# Patient Record
Sex: MALE | Race: OTHER | Employment: FULL TIME | URBAN - METROPOLITAN AREA
[De-identification: names, ages, dates, MRNs, and addresses within clinical notes are randomized per-mention and may not be internally consistent; named-entity substitution may affect disease eponyms.]

---

## 2018-05-16 ENCOUNTER — OFFICE VISIT (OUTPATIENT)
Dept: FAMILY MEDICINE CLINIC | Facility: CLINIC | Age: 53
End: 2018-05-16

## 2018-05-16 VITALS
WEIGHT: 198 LBS | HEIGHT: 67.32 IN | BODY MASS INDEX: 30.71 KG/M2 | HEART RATE: 66 BPM | SYSTOLIC BLOOD PRESSURE: 122 MMHG | DIASTOLIC BLOOD PRESSURE: 76 MMHG

## 2018-05-16 DIAGNOSIS — I10 ESSENTIAL HYPERTENSION: Primary | ICD-10-CM

## 2018-05-16 PROCEDURE — 99203 OFFICE O/P NEW LOW 30 MIN: CPT | Performed by: FAMILY MEDICINE

## 2018-05-16 RX ORDER — NEBIVOLOL HYDROCHLORIDE 20 MG/1
1 TABLET ORAL DAILY
COMMUNITY
Start: 2018-04-16 | End: 2018-05-16

## 2018-05-16 RX ORDER — NEBIVOLOL HYDROCHLORIDE 20 MG/1
20 TABLET ORAL DAILY
Qty: 30 TABLET | Refills: 0 | Status: CANCELLED | OUTPATIENT
Start: 2018-05-16

## 2018-05-16 RX ORDER — CHLORTHALIDONE 25 MG/1
1 TABLET ORAL DAILY
COMMUNITY
Start: 2018-04-16 | End: 2018-05-16

## 2018-05-16 RX ORDER — CHLORTHALIDONE 25 MG/1
25 TABLET ORAL DAILY
Qty: 90 TABLET | Refills: 1 | Status: SHIPPED | OUTPATIENT
Start: 2018-05-16 | End: 2018-10-10

## 2018-05-16 RX ORDER — METOPROLOL SUCCINATE 50 MG/1
50 TABLET, EXTENDED RELEASE ORAL DAILY
Qty: 90 TABLET | Refills: 1 | Status: SHIPPED | OUTPATIENT
Start: 2018-05-16 | End: 2018-07-13

## 2018-05-16 NOTE — PATIENT INSTRUCTIONS
-- stop bystolic  -- start metoprolol 50mg daily  -- continue chlorthalidone 25mg daily  -- check blood pressure daily for next 1-2 wks  -- call if BP consistently > 140/90

## 2018-05-16 NOTE — PROGRESS NOTES
Dennis Del Valle is a 48year old male here for Patient presents with:  Hypertension      HPI:       HTN  -tolerating meds  -on bystolic and chlorthalidone  -last BP check was 6 months ago - thinks he had a physical at that time  -denies chest pain, palpitat in this encounter. Meds This Visit:    Signed Prescriptions Disp Refills    chlorthalidone 25 MG Oral Tab 90 tablet 1      Sig: Take 1 tablet (25 mg total) by mouth daily.       Metoprolol Succinate ER 50 MG Oral Tablet 24 Hr 90 tablet 1      Sig: Take

## 2018-07-13 ENCOUNTER — OFFICE VISIT (OUTPATIENT)
Dept: FAMILY MEDICINE CLINIC | Facility: CLINIC | Age: 53
End: 2018-07-13

## 2018-07-13 VITALS
WEIGHT: 200 LBS | BODY MASS INDEX: 31.02 KG/M2 | HEART RATE: 68 BPM | DIASTOLIC BLOOD PRESSURE: 76 MMHG | SYSTOLIC BLOOD PRESSURE: 122 MMHG | HEIGHT: 67.32 IN

## 2018-07-13 DIAGNOSIS — N52.8 OTHER MALE ERECTILE DYSFUNCTION: ICD-10-CM

## 2018-07-13 DIAGNOSIS — I10 ESSENTIAL HYPERTENSION: Primary | ICD-10-CM

## 2018-07-13 PROCEDURE — 99214 OFFICE O/P EST MOD 30 MIN: CPT | Performed by: FAMILY MEDICINE

## 2018-07-13 RX ORDER — METOPROLOL SUCCINATE 50 MG/1
50 TABLET, EXTENDED RELEASE ORAL DAILY
Qty: 90 TABLET | Refills: 1 | Status: SHIPPED | OUTPATIENT
Start: 2018-07-13 | End: 2018-10-10

## 2018-07-13 RX ORDER — CHLORTHALIDONE 25 MG/1
25 TABLET ORAL DAILY
Qty: 90 TABLET | Refills: 1 | Status: CANCELLED | OUTPATIENT
Start: 2018-07-13

## 2018-07-13 RX ORDER — SILDENAFIL 100 MG/1
100 TABLET, FILM COATED ORAL
Qty: 8 TABLET | Refills: 2 | Status: SHIPPED | OUTPATIENT
Start: 2018-07-13 | End: 2019-03-25

## 2018-07-13 NOTE — PATIENT INSTRUCTIONS
-- pare chlorthalidone    -- continua metoprolol    -- chequar wagner presion 3 - 5 veces al semana    -- regresa en un mes con wagner numeros

## 2018-07-13 NOTE — PROGRESS NOTES
Doan Caputo is a 48year old male here for Patient presents with:  Hypertension      HPI:       1.  Essential hypertension  -liquid melted his chlorthalidone about 1 wk ago  -since then, has only been taking metoprolol  -BP seemed controlled at home  -wo 122/76 (BP Location: Left arm, Patient Position: Sitting, Cuff Size: large)   Pulse 68   Ht 67.32\"   Wt 200 lb   BMI 31.03 kg/m²     Gen: NAD, alert and oriented x 3  HEENT: NCAT, pupils equal and round  Pulm: CTAB, no wheezing  CV: RRR, no murmurs  Ext:

## 2018-08-03 ENCOUNTER — APPOINTMENT (OUTPATIENT)
Dept: GENERAL RADIOLOGY | Age: 53
End: 2018-08-03
Attending: FAMILY MEDICINE
Payer: COMMERCIAL

## 2018-08-03 ENCOUNTER — APPOINTMENT (OUTPATIENT)
Dept: GENERAL RADIOLOGY | Facility: HOSPITAL | Age: 53
End: 2018-08-03
Payer: COMMERCIAL

## 2018-08-03 ENCOUNTER — HOSPITAL ENCOUNTER (OUTPATIENT)
Age: 53
Discharge: ANOTHER HOSPITAL | End: 2018-08-03
Attending: FAMILY MEDICINE
Payer: COMMERCIAL

## 2018-08-03 ENCOUNTER — HOSPITAL ENCOUNTER (EMERGENCY)
Facility: HOSPITAL | Age: 53
Discharge: HOME | End: 2018-08-03
Attending: EMERGENCY MEDICINE
Payer: COMMERCIAL

## 2018-08-03 VITALS
OXYGEN SATURATION: 93 % | WEIGHT: 195 LBS | BODY MASS INDEX: 29.55 KG/M2 | HEART RATE: 56 BPM | RESPIRATION RATE: 18 BRPM | HEIGHT: 68 IN | TEMPERATURE: 97 F | SYSTOLIC BLOOD PRESSURE: 151 MMHG | DIASTOLIC BLOOD PRESSURE: 92 MMHG

## 2018-08-03 VITALS
DIASTOLIC BLOOD PRESSURE: 100 MMHG | SYSTOLIC BLOOD PRESSURE: 146 MMHG | OXYGEN SATURATION: 97 % | HEART RATE: 66 BPM | RESPIRATION RATE: 18 BRPM | TEMPERATURE: 98 F

## 2018-08-03 DIAGNOSIS — L81.9 DISCOLORATION OF SKIN OF FINGER: Primary | ICD-10-CM

## 2018-08-03 DIAGNOSIS — I99.9 CIRCULATORY SYSTEM DISORDER: Primary | ICD-10-CM

## 2018-08-03 DIAGNOSIS — I73.00 RAYNAUD'S DISEASE WITHOUT GANGRENE: ICD-10-CM

## 2018-08-03 LAB
ALBUMIN SERPL-MCNC: 4 G/DL (ref 3.5–4.8)
ALBUMIN/GLOB SERPL: 1.2 {RATIO} (ref 1–2)
ALP LIVER SERPL-CCNC: 75 U/L (ref 45–117)
ALT SERPL-CCNC: 39 U/L (ref 17–63)
ANION GAP SERPL CALC-SCNC: 6 MMOL/L (ref 0–18)
AST SERPL-CCNC: 27 U/L (ref 15–41)
BASOPHILS # BLD AUTO: 0.03 X10(3) UL (ref 0–0.1)
BASOPHILS NFR BLD AUTO: 0.5 %
BILIRUB SERPL-MCNC: 0.5 MG/DL (ref 0.1–2)
BUN BLD-MCNC: 18 MG/DL (ref 8–20)
BUN/CREAT SERPL: 18.8 (ref 10–20)
CALCIUM BLD-MCNC: 8.9 MG/DL (ref 8.3–10.3)
CHLORIDE SERPL-SCNC: 107 MMOL/L (ref 101–111)
CO2 SERPL-SCNC: 29 MMOL/L (ref 22–32)
CREAT BLD-MCNC: 0.96 MG/DL (ref 0.7–1.3)
EOSINOPHIL # BLD AUTO: 0.15 X10(3) UL (ref 0–0.3)
EOSINOPHIL NFR BLD AUTO: 2.6 %
ERYTHROCYTE [DISTWIDTH] IN BLOOD BY AUTOMATED COUNT: 12.7 % (ref 11.5–16)
GLOBULIN PLAS-MCNC: 3.4 G/DL (ref 2.5–3.7)
GLUCOSE BLD-MCNC: 100 MG/DL (ref 70–99)
HCT VFR BLD AUTO: 42.8 % (ref 37–53)
HGB BLD-MCNC: 14.8 G/DL (ref 13–17)
IMMATURE GRANULOCYTE COUNT: 0.02 X10(3) UL (ref 0–1)
IMMATURE GRANULOCYTE RATIO %: 0.3 %
LYMPHOCYTES # BLD AUTO: 2.61 X10(3) UL (ref 0.9–4)
LYMPHOCYTES NFR BLD AUTO: 45.6 %
M PROTEIN MFR SERPL ELPH: 7.4 G/DL (ref 6.1–8.3)
MCH RBC QN AUTO: 31.6 PG (ref 27–33.2)
MCHC RBC AUTO-ENTMCNC: 34.6 G/DL (ref 31–37)
MCV RBC AUTO: 91.3 FL (ref 80–99)
MONOCYTES # BLD AUTO: 0.56 X10(3) UL (ref 0.1–1)
MONOCYTES NFR BLD AUTO: 9.8 %
NEUTROPHIL ABS PRELIM: 2.35 X10 (3) UL (ref 1.3–6.7)
NEUTROPHILS # BLD AUTO: 2.35 X10(3) UL (ref 1.3–6.7)
NEUTROPHILS NFR BLD AUTO: 41.2 %
OSMOLALITY SERPL CALC.SUM OF ELEC: 296 MOSM/KG (ref 275–295)
PLATELET # BLD AUTO: 220 10(3)UL (ref 150–450)
POTASSIUM SERPL-SCNC: 3 MMOL/L (ref 3.6–5.1)
RBC # BLD AUTO: 4.69 X10(6)UL (ref 4.3–5.7)
RED CELL DISTRIBUTION WIDTH-SD: 42 FL (ref 35.1–46.3)
SED RATE-ML: 10 MM/HR (ref 0–12)
SODIUM SERPL-SCNC: 142 MMOL/L (ref 136–144)
WBC # BLD AUTO: 5.7 X10(3) UL (ref 4–13)

## 2018-08-03 PROCEDURE — 99203 OFFICE O/P NEW LOW 30 MIN: CPT

## 2018-08-03 PROCEDURE — 73130 X-RAY EXAM OF HAND: CPT | Performed by: EMERGENCY MEDICINE

## 2018-08-03 PROCEDURE — 85025 COMPLETE CBC W/AUTO DIFF WBC: CPT | Performed by: PHYSICIAN ASSISTANT

## 2018-08-03 PROCEDURE — 80053 COMPREHEN METABOLIC PANEL: CPT | Performed by: PHYSICIAN ASSISTANT

## 2018-08-03 PROCEDURE — 36415 COLL VENOUS BLD VENIPUNCTURE: CPT

## 2018-08-03 PROCEDURE — 99284 EMERGENCY DEPT VISIT MOD MDM: CPT

## 2018-08-03 PROCEDURE — 73140 X-RAY EXAM OF FINGER(S): CPT | Performed by: FAMILY MEDICINE

## 2018-08-03 PROCEDURE — 85652 RBC SED RATE AUTOMATED: CPT | Performed by: PHYSICIAN ASSISTANT

## 2018-08-03 PROCEDURE — 99213 OFFICE O/P EST LOW 20 MIN: CPT

## 2018-08-03 NOTE — ED INITIAL ASSESSMENT (HPI)
States his left fifth finger has been purple for the last two days. Denies any injury. States feels numb at times.

## 2018-08-04 NOTE — ED PROVIDER NOTES
Patient Seen in: [facility]    History   Patient presents with:  Upper Extremity Injury (musculoskeletal)    Stated Complaint: right pinky changes colors, and is numb, x2days    HPI    48year old male presents for right little digit. CR > 4-5 sec. Mild tenderness over DIP. FROM. Rest of the fingers and hand exam was normal.    Neurological: He is alert and oriented to person, place, and time. He has normal reflexes. Skin: Skin is warm and dry.        ED Course   Labs Reviewed -

## 2018-08-04 NOTE — ED PROVIDER NOTES
Patient Seen in: [facility] Emergency Department    History   Patient presents with:  Upper Extremity Injury (musculoskeletal)    Stated Complaint: finger discoloration    HPI    Zoie Gamboa is a 45-year-old male who presents today for evaluation of right Temp 97.2 °F (36.2 °C)   Resp 18   Ht 172.7 cm (5' 8\")   Wt 88.5 kg   SpO2 93%   BMI 29.65 kg/m²         Physical Exam   Constitutional: He is oriented to person, place, and time. He appears well-developed and well-nourished.    HENT:   Head: Normocephalic 2325  ------------------------------------------------------------  This case is discussed with Dr. Jay Hernández who agrees with the plan of care.     MDM   Clinical impression: Raynaud's disease without gangrene  Plan: I discussed our concerns with the patient

## 2018-09-05 ENCOUNTER — OFFICE VISIT (OUTPATIENT)
Dept: FAMILY MEDICINE CLINIC | Facility: CLINIC | Age: 53
End: 2018-09-05
Payer: COMMERCIAL

## 2018-09-05 VITALS
HEIGHT: 67.32 IN | DIASTOLIC BLOOD PRESSURE: 89 MMHG | WEIGHT: 198 LBS | SYSTOLIC BLOOD PRESSURE: 137 MMHG | BODY MASS INDEX: 30.71 KG/M2 | HEART RATE: 66 BPM

## 2018-09-05 DIAGNOSIS — Z12.11 COLON CANCER SCREENING: ICD-10-CM

## 2018-09-05 DIAGNOSIS — I73.00 RAYNAUD'S DISEASE WITHOUT GANGRENE: ICD-10-CM

## 2018-09-05 DIAGNOSIS — I10 ESSENTIAL HYPERTENSION: ICD-10-CM

## 2018-09-05 DIAGNOSIS — Z00.00 ROUTINE GENERAL MEDICAL EXAMINATION AT A HEALTH CARE FACILITY: Primary | ICD-10-CM

## 2018-09-05 PROCEDURE — 99396 PREV VISIT EST AGE 40-64: CPT | Performed by: FAMILY MEDICINE

## 2018-09-05 PROCEDURE — 99214 OFFICE O/P EST MOD 30 MIN: CPT | Performed by: FAMILY MEDICINE

## 2018-09-05 RX ORDER — AMLODIPINE BESYLATE 5 MG/1
5 TABLET ORAL DAILY
Qty: 30 TABLET | Refills: 2 | Status: SHIPPED | OUTPATIENT
Start: 2018-09-05 | End: 2018-10-10 | Stop reason: DRUGHIGH

## 2018-09-05 NOTE — PATIENT INSTRUCTIONS
-- pare chlorthalidone  -- empeza amlodipine 5 mg daily  -- hace kayla alphonso para shannan en [place], agua esta [place]    -- llama para hacer kayla alphonso de rheumatologo (para wagner dedo) y colonoscopia    -- regresa en un mes

## 2018-09-06 NOTE — PROGRESS NOTES
Fidencio Morales is a 48year old male here for Patient presents with: Well Adult      HPI:       1.  Routine general medical examination at a health care facility  -here for wellness  -due for colonoscopy  -no urinary symptoms  -no moles  -doesn't smoke or chlorthalidone 25 MG Oral Tab Take 1 tablet (25 mg total) by mouth daily.  Disp: 90 tablet Rfl: 1       Allergies:    Cefaclor                RASH      ROS:     --GEN: Denies  --HEENT: Denies  --RESP: Denies  --CV: Denies  --GI: Denies  --: Denies  --[facility] & Referrals:  More Becerra MD

## 2018-09-27 ENCOUNTER — TELEPHONE (OUTPATIENT)
Dept: FAMILY MEDICINE CLINIC | Facility: CLINIC | Age: 53
End: 2018-09-27

## 2018-09-27 NOTE — TELEPHONE ENCOUNTER
Pt's daughter called and said Renata Houston needs a refill on Amlodipine 5mg 2 daily. He would like it called in to [facility] in [place] on [address].

## 2018-09-28 RX ORDER — AMLODIPINE BESYLATE 10 MG/1
10 TABLET ORAL DAILY
Qty: 30 TABLET | Refills: 0 | Status: SHIPPED | OUTPATIENT
Start: 2018-09-28 | End: 2018-10-10

## 2018-09-28 NOTE — TELEPHONE ENCOUNTER
Patient has refills remaining of the amlodipine at the pharmacy  Patient will need to contact the pharmacy for refills  Patient should only be taking 1 amlodipine per day    Patient increased amlodipine to 2/day.  Abigail Collins told him to increase amlodipi

## 2018-10-10 ENCOUNTER — OFFICE VISIT (OUTPATIENT)
Dept: FAMILY MEDICINE CLINIC | Facility: CLINIC | Age: 53
End: 2018-10-10
Payer: COMMERCIAL

## 2018-10-10 VITALS
SYSTOLIC BLOOD PRESSURE: 132 MMHG | WEIGHT: 199 LBS | HEART RATE: 76 BPM | DIASTOLIC BLOOD PRESSURE: 76 MMHG | OXYGEN SATURATION: 94 % | HEIGHT: 67.32 IN | TEMPERATURE: 99 F | BODY MASS INDEX: 30.87 KG/M2

## 2018-10-10 DIAGNOSIS — I73.00 RAYNAUD'S DISEASE WITHOUT GANGRENE: ICD-10-CM

## 2018-10-10 DIAGNOSIS — I10 ESSENTIAL HYPERTENSION: Primary | ICD-10-CM

## 2018-10-10 DIAGNOSIS — R07.89 ATYPICAL CHEST PAIN: ICD-10-CM

## 2018-10-10 PROCEDURE — 99214 OFFICE O/P EST MOD 30 MIN: CPT | Performed by: FAMILY MEDICINE

## 2018-10-10 PROCEDURE — 93000 ELECTROCARDIOGRAM COMPLETE: CPT | Performed by: FAMILY MEDICINE

## 2018-10-10 RX ORDER — METOPROLOL SUCCINATE 50 MG/1
50 TABLET, EXTENDED RELEASE ORAL DAILY
Qty: 90 TABLET | Refills: 1 | Status: SHIPPED | OUTPATIENT
Start: 2018-10-10 | End: 2019-03-25

## 2018-10-10 RX ORDER — AMLODIPINE BESYLATE 5 MG/1
5 TABLET ORAL DAILY
Qty: 90 TABLET | Refills: 2 | Status: SHIPPED | OUTPATIENT
Start: 2018-10-10 | End: 2018-10-22

## 2018-10-10 NOTE — PROGRESS NOTES
Rimma Sanchez is a 48year old male here for No chief complaint on file. HPI:       1.  Essential hypertension  -BP better, but developed leg swelling  -taking amlodipine 10mg daily  -off metoprolol (wanted to try only 1 med) and chlorthalidone (cause reviewed are negative    PHYSICAL EXAM:   /76 (BP Location: Right arm, Patient Position: Sitting, Cuff Size: large)   Pulse 76   Temp 98.5 °F (36.9 °C) (Oral)   Ht 67.32\"   Wt 199 lb   SpO2 94%   BMI 30.87 kg/m²     Gen: NAD, alert and oriented x 3

## 2018-10-11 NOTE — PATIENT INSTRUCTIONS
-- STOP amlodipine 10  -- start amlodipine 5 mg daily (if you have enough, you can use 1/2 tab of the 10mg until you run out)  -- start metoprolol 50mg ER daily again  -- you should no longer be taking chlorthalidone  -- come back in 1 month, sooner if n

## 2018-10-22 ENCOUNTER — OFFICE VISIT (OUTPATIENT)
Dept: FAMILY MEDICINE CLINIC | Facility: CLINIC | Age: 53
End: 2018-10-22
Payer: COMMERCIAL

## 2018-10-22 VITALS
HEIGHT: 67.32 IN | DIASTOLIC BLOOD PRESSURE: 83 MMHG | HEART RATE: 78 BPM | SYSTOLIC BLOOD PRESSURE: 134 MMHG | WEIGHT: 198.81 LBS | TEMPERATURE: 98 F | BODY MASS INDEX: 30.84 KG/M2 | RESPIRATION RATE: 16 BRPM

## 2018-10-22 DIAGNOSIS — R60.0 LOWER EXTREMITY EDEMA: ICD-10-CM

## 2018-10-22 DIAGNOSIS — R07.89 ATYPICAL CHEST PAIN: ICD-10-CM

## 2018-10-22 DIAGNOSIS — I10 ESSENTIAL HYPERTENSION: Primary | ICD-10-CM

## 2018-10-22 PROCEDURE — 99214 OFFICE O/P EST MOD 30 MIN: CPT | Performed by: FAMILY MEDICINE

## 2018-10-22 RX ORDER — HYDROCHLOROTHIAZIDE 12.5 MG/1
12.5 TABLET ORAL DAILY
Qty: 90 TABLET | Refills: 0 | Status: SHIPPED | OUTPATIENT
Start: 2018-10-22 | End: 2019-01-21

## 2018-10-22 NOTE — PATIENT INSTRUCTIONS
-- stop amlodipine  -- start hydrochlorothiazide 12.5mg daily  -- continue metoprolol cada lucius  -- va a lab en 2 semanas  -- regresa aqui despues shannan para wagner presion

## 2018-10-22 NOTE — PROGRESS NOTES
Kayleigh Gilbert is a 48year old male here for Patient presents with:  Medication Follow-Up: Patient had some swelling in legs after starting BP medication. No current swelling      HPI:       1. Essential hypertension  2.  Lower extremity edema  -bp good  - kg/m²     Gen: NAD, alert and oriented x 3  HEENT: NCAT, pupils equal and round  Pulm: CTAB, no wheezing  CV: RRR, no murmurs  Abd: soft, ND, NT, +BS  Ext: full ROM  Psych: normal affect     ASSESSMENT/PLAN:     1. Essential hypertension  2.  Lower extremit

## 2018-11-02 ENCOUNTER — APPOINTMENT (OUTPATIENT)
Dept: LAB | Age: 53
End: 2018-11-02
Attending: FAMILY MEDICINE
Payer: COMMERCIAL

## 2018-11-02 DIAGNOSIS — I73.00 RAYNAUD'S DISEASE WITHOUT GANGRENE: ICD-10-CM

## 2018-11-02 DIAGNOSIS — Z00.00 ROUTINE GENERAL MEDICAL EXAMINATION AT A HEALTH CARE FACILITY: ICD-10-CM

## 2018-11-02 PROCEDURE — 85652 RBC SED RATE AUTOMATED: CPT | Performed by: FAMILY MEDICINE

## 2018-11-02 PROCEDURE — 86200 CCP ANTIBODY: CPT | Performed by: FAMILY MEDICINE

## 2018-11-02 PROCEDURE — 84153 ASSAY OF PSA TOTAL: CPT | Performed by: FAMILY MEDICINE

## 2018-11-02 PROCEDURE — 80061 LIPID PANEL: CPT | Performed by: FAMILY MEDICINE

## 2018-11-02 PROCEDURE — 86140 C-REACTIVE PROTEIN: CPT | Performed by: FAMILY MEDICINE

## 2018-11-02 PROCEDURE — 84550 ASSAY OF BLOOD/URIC ACID: CPT | Performed by: FAMILY MEDICINE

## 2018-11-02 PROCEDURE — 86038 ANTINUCLEAR ANTIBODIES: CPT | Performed by: FAMILY MEDICINE

## 2018-11-02 PROCEDURE — 84443 ASSAY THYROID STIM HORMONE: CPT | Performed by: FAMILY MEDICINE

## 2018-11-02 PROCEDURE — 36415 COLL VENOUS BLD VENIPUNCTURE: CPT | Performed by: FAMILY MEDICINE

## 2018-11-02 PROCEDURE — 80053 COMPREHEN METABOLIC PANEL: CPT | Performed by: FAMILY MEDICINE

## 2018-11-02 PROCEDURE — 86431 RHEUMATOID FACTOR QUANT: CPT | Performed by: FAMILY MEDICINE

## 2018-11-06 ENCOUNTER — TELEPHONE (OUTPATIENT)
Dept: FAMILY MEDICINE CLINIC | Facility: CLINIC | Age: 53
End: 2018-11-06

## 2018-11-06 NOTE — TELEPHONE ENCOUNTER
----- Message from Jagjit Sanchez MD sent at 11/3/2018  1:25 PM CDT -----  Cholesterol mildly elevated - increase exercise, reduce fats and sugars, more fruits and veggies; otherwise  Labs normal, followup as planned

## 2018-12-10 ENCOUNTER — TELEPHONE (OUTPATIENT)
Dept: FAMILY MEDICINE CLINIC | Facility: CLINIC | Age: 53
End: 2018-12-10

## 2019-01-21 RX ORDER — HYDROCHLOROTHIAZIDE 12.5 MG/1
TABLET ORAL
Qty: 90 TABLET | Refills: 0 | Status: SHIPPED | OUTPATIENT
Start: 2019-01-21 | End: 2019-03-25

## 2019-03-21 ENCOUNTER — TELEPHONE (OUTPATIENT)
Dept: FAMILY MEDICINE CLINIC | Facility: CLINIC | Age: 54
End: 2019-03-21

## 2019-03-21 NOTE — TELEPHONE ENCOUNTER
Spoke with patient's wife informed her to have patient keep appointment for bp follow up. Patient's wife informed me that patient has hand swelling.  Patient will keep appointment for 03/25/2018

## 2019-03-25 ENCOUNTER — OFFICE VISIT (OUTPATIENT)
Dept: FAMILY MEDICINE CLINIC | Facility: CLINIC | Age: 54
End: 2019-03-25
Payer: COMMERCIAL

## 2019-03-25 VITALS
BODY MASS INDEX: 30.87 KG/M2 | SYSTOLIC BLOOD PRESSURE: 152 MMHG | WEIGHT: 199 LBS | HEART RATE: 64 BPM | TEMPERATURE: 99 F | DIASTOLIC BLOOD PRESSURE: 100 MMHG | HEIGHT: 67.32 IN

## 2019-03-25 DIAGNOSIS — G89.29 CHRONIC RIGHT SHOULDER PAIN: Primary | ICD-10-CM

## 2019-03-25 DIAGNOSIS — I10 ESSENTIAL HYPERTENSION: ICD-10-CM

## 2019-03-25 DIAGNOSIS — M25.511 CHRONIC RIGHT SHOULDER PAIN: Primary | ICD-10-CM

## 2019-03-25 PROCEDURE — 99214 OFFICE O/P EST MOD 30 MIN: CPT | Performed by: FAMILY MEDICINE

## 2019-03-25 RX ORDER — SILDENAFIL 100 MG/1
100 TABLET, FILM COATED ORAL
Qty: 8 TABLET | Refills: 2 | Status: SHIPPED | OUTPATIENT
Start: 2019-03-25 | End: 2019-10-15

## 2019-03-25 RX ORDER — HYDROCHLOROTHIAZIDE 25 MG/1
25 TABLET ORAL DAILY
Qty: 90 TABLET | Refills: 1 | Status: SHIPPED | OUTPATIENT
Start: 2019-03-25 | End: 2019-09-12

## 2019-03-25 RX ORDER — METOPROLOL SUCCINATE 50 MG/1
50 TABLET, EXTENDED RELEASE ORAL DAILY
Qty: 90 TABLET | Refills: 1 | Status: SHIPPED | OUTPATIENT
Start: 2019-03-25 | End: 2019-09-12

## 2019-03-25 NOTE — PROGRESS NOTES
Ignacia Degree is a 47year old male here for Patient presents with:  Hypertension: Follow up      HPI:       1. Chronic right shoulder pain  -continues to have chronic shoulder pain  -not improving with home exercises    2.  Essential hypertension  -here t NCAT, pupils equal and round  Pulm: CTAB, no wheezing  CV: RRR, no murmurs  Ext: full ROM  Psych: normal affect     ASSESSMENT/PLAN:     1.  Chronic right shoulder pain  -not improving with home exercises  -start PT   -f/u in 2-4 wks, sooner prn  - OP REFER

## 2019-03-25 NOTE — PATIENT INSTRUCTIONS
-- empeza omega-3 cada lucius    -- sube hydrochlorothiazide a 25mg cada lucius  -- continua metoprolol 50mg cada lucius    -- haceuna alphonso de terapia  --ejercisios de wagner hombro cada lucius    regresa en 2 semanas para pruebas de shannan    regresa para kayla alphonso en 2-4

## 2019-04-24 ENCOUNTER — OFFICE VISIT (OUTPATIENT)
Dept: FAMILY MEDICINE CLINIC | Facility: CLINIC | Age: 54
End: 2019-04-24
Payer: COMMERCIAL

## 2019-04-24 VITALS
BODY MASS INDEX: 30.48 KG/M2 | TEMPERATURE: 98 F | SYSTOLIC BLOOD PRESSURE: 130 MMHG | HEIGHT: 67.32 IN | DIASTOLIC BLOOD PRESSURE: 82 MMHG | WEIGHT: 196.5 LBS | HEART RATE: 70 BPM

## 2019-04-24 DIAGNOSIS — M25.511 CHRONIC RIGHT SHOULDER PAIN: ICD-10-CM

## 2019-04-24 DIAGNOSIS — G89.29 CHRONIC RIGHT SHOULDER PAIN: ICD-10-CM

## 2019-04-24 DIAGNOSIS — I10 ESSENTIAL HYPERTENSION: Primary | ICD-10-CM

## 2019-04-24 DIAGNOSIS — Z12.11 SCREENING FOR COLON CANCER: ICD-10-CM

## 2019-04-24 PROCEDURE — 99214 OFFICE O/P EST MOD 30 MIN: CPT | Performed by: FAMILY MEDICINE

## 2019-04-24 NOTE — PROGRESS NOTES
Roberto Carlos Bueno is a 47year old male here for Patient presents with:  Hypertension: follow up      HPI:       1. Essential hypertension  -tolerating meds  -bp better at home  -here for bp recheck    2. Chronic right shoulder pain  -improved    3.  Screening BMI 30.48 kg/m²     Gen: NAD, alert and oriented x 3  HEENT: NCAT, pupils equal and round  Pulm: CTAB, no wheezing  CV: RRR, no murmurs  Ext: full ROM  Psych: normal affect     ASSESSMENT/PLAN:     1.  Essential hypertension  -improved  -c/w current meds

## 2019-04-24 NOTE — PATIENT INSTRUCTIONS
-- continua todo wagner medicamentos  --llama para alphonso de gastro para colonoscopia    --regresa en 6 meses, antes si necessita

## 2019-09-12 ENCOUNTER — TELEPHONE (OUTPATIENT)
Dept: FAMILY MEDICINE CLINIC | Facility: CLINIC | Age: 54
End: 2019-09-12

## 2019-09-12 DIAGNOSIS — I10 ESSENTIAL HYPERTENSION: Primary | ICD-10-CM

## 2019-09-12 RX ORDER — METOPROLOL SUCCINATE 50 MG/1
50 TABLET, EXTENDED RELEASE ORAL DAILY
Qty: 30 TABLET | Refills: 0 | Status: SHIPPED | OUTPATIENT
Start: 2019-09-12 | End: 2019-10-15

## 2019-09-12 RX ORDER — HYDROCHLOROTHIAZIDE 25 MG/1
25 TABLET ORAL DAILY
Qty: 30 TABLET | Refills: 0 | Status: SHIPPED | OUTPATIENT
Start: 2019-09-12 | End: 2019-10-15

## 2019-09-12 NOTE — TELEPHONE ENCOUNTER
Pt requesting refill of Hydrochlorothiazide, Metoprolol, refill approved, sent to pharmacy for qty# 30    Last Time Medication was Filled:  3/25/19    Last Office Visit with PCP: 4/24/2019    Future appointment 10/15/19

## 2019-09-18 RX ORDER — HYDROCHLOROTHIAZIDE 25 MG/1
TABLET ORAL
Qty: 90 TABLET | Refills: 0 | OUTPATIENT
Start: 2019-09-18

## 2019-09-18 RX ORDER — METOPROLOL SUCCINATE 50 MG/1
TABLET, EXTENDED RELEASE ORAL
Qty: 90 TABLET | Refills: 0 | OUTPATIENT
Start: 2019-09-18

## 2019-09-18 NOTE — TELEPHONE ENCOUNTER
90 day supply denied.  Sent Qty#30 on 9/12/19 has follow up appointment 10/15/2019 for further refills

## 2019-10-15 ENCOUNTER — OFFICE VISIT (OUTPATIENT)
Dept: FAMILY MEDICINE CLINIC | Facility: CLINIC | Age: 54
End: 2019-10-15
Payer: COMMERCIAL

## 2019-10-15 VITALS
HEIGHT: 67.32 IN | BODY MASS INDEX: 30.25 KG/M2 | TEMPERATURE: 97 F | WEIGHT: 195 LBS | HEART RATE: 85 BPM | DIASTOLIC BLOOD PRESSURE: 78 MMHG | SYSTOLIC BLOOD PRESSURE: 134 MMHG

## 2019-10-15 DIAGNOSIS — G89.29 CHRONIC RIGHT SHOULDER PAIN: ICD-10-CM

## 2019-10-15 DIAGNOSIS — M25.511 CHRONIC RIGHT SHOULDER PAIN: ICD-10-CM

## 2019-10-15 DIAGNOSIS — Z00.00 ROUTINE GENERAL MEDICAL EXAMINATION AT A HEALTH CARE FACILITY: Primary | ICD-10-CM

## 2019-10-15 DIAGNOSIS — I10 ESSENTIAL HYPERTENSION: ICD-10-CM

## 2019-10-15 DIAGNOSIS — Z28.21 IMMUNIZATION NOT CARRIED OUT BECAUSE OF PATIENT REFUSAL: ICD-10-CM

## 2019-10-15 PROCEDURE — 99214 OFFICE O/P EST MOD 30 MIN: CPT | Performed by: FAMILY MEDICINE

## 2019-10-15 PROCEDURE — 99396 PREV VISIT EST AGE 40-64: CPT | Performed by: FAMILY MEDICINE

## 2019-10-15 RX ORDER — HYDROCHLOROTHIAZIDE 25 MG/1
25 TABLET ORAL DAILY
Qty: 90 TABLET | Refills: 1 | Status: SHIPPED | OUTPATIENT
Start: 2019-10-15 | End: 2019-10-21

## 2019-10-15 RX ORDER — METOPROLOL SUCCINATE 50 MG/1
50 TABLET, EXTENDED RELEASE ORAL DAILY
Qty: 90 TABLET | Refills: 1 | Status: SHIPPED | OUTPATIENT
Start: 2019-10-15 | End: 2019-10-21

## 2019-10-15 RX ORDER — SILDENAFIL 100 MG/1
100 TABLET, FILM COATED ORAL
Qty: 8 TABLET | Refills: 2 | Status: SHIPPED | OUTPATIENT
Start: 2019-10-15

## 2019-10-15 NOTE — PROGRESS NOTES
Caty Blair is a 47year old male here for Patient presents with:  Hypertension      HPI:       1.  Routine general medical examination at a health care facility  -here for wellness  -due for colonoscopy  -no urinary symptoms  -no moles  -doesn't smoke o --GEN: Denies  --HEENT: Denies  --RESP: Denies  --CV: Denies  --GI: Denies  --: Denies  --MSK: Denies  --NEURO: Denies  --PSYCH: Denies  --HEME/LYMPH/IMMUN: Denies  --ENDO: Denies  --SKIN: Denies  All other systems reviewed are negative    PHYSICAL E Vincent Quintanilla MD    I spent a total of 25 minutes, more than half of which was spent counseling/coordinating care regarding htn, right shoulder pain

## 2019-10-15 NOTE — PATIENT INSTRUCTIONS
-- come in for fasting bloodwork anytime that you are able to (8-10h no food, only water; lab here is open M-F, 8am-12)  -- go to [email or web address] to schedule an appointment online  -- we will call with results about 5-7 days after bloodwork is completed

## 2019-10-18 DIAGNOSIS — I10 ESSENTIAL HYPERTENSION: ICD-10-CM

## 2019-10-21 RX ORDER — METOPROLOL SUCCINATE 50 MG/1
TABLET, EXTENDED RELEASE ORAL
Qty: 90 TABLET | Refills: 1 | Status: SHIPPED | OUTPATIENT
Start: 2019-10-21 | End: 2020-09-14

## 2019-10-21 RX ORDER — HYDROCHLOROTHIAZIDE 25 MG/1
TABLET ORAL
Qty: 90 TABLET | Refills: 1 | Status: SHIPPED | OUTPATIENT
Start: 2019-10-21 | End: 2020-08-13

## 2019-10-21 NOTE — TELEPHONE ENCOUNTER
Pt requesting refill of HYDROCHLOROTHIAZIDE 25 MG Oral Tab and METOPROLOL SUCCINATE ER 50 MG Oral Tablet 24 Hr, passed protocol , refill approved, sent to pharmacy

## 2019-12-05 ENCOUNTER — LAB ENCOUNTER (OUTPATIENT)
Dept: LAB | Age: 54
End: 2019-12-05
Attending: FAMILY MEDICINE
Payer: COMMERCIAL

## 2019-12-05 DIAGNOSIS — Z00.00 ROUTINE GENERAL MEDICAL EXAMINATION AT A HEALTH CARE FACILITY: ICD-10-CM

## 2019-12-05 DIAGNOSIS — I10 ESSENTIAL HYPERTENSION: ICD-10-CM

## 2019-12-05 PROCEDURE — 80053 COMPREHEN METABOLIC PANEL: CPT | Performed by: FAMILY MEDICINE

## 2019-12-05 PROCEDURE — 80061 LIPID PANEL: CPT | Performed by: FAMILY MEDICINE

## 2019-12-05 PROCEDURE — 84153 ASSAY OF PSA TOTAL: CPT | Performed by: FAMILY MEDICINE

## 2019-12-05 PROCEDURE — 36415 COLL VENOUS BLD VENIPUNCTURE: CPT | Performed by: FAMILY MEDICINE

## 2019-12-10 ENCOUNTER — TELEPHONE (OUTPATIENT)
Dept: FAMILY MEDICINE CLINIC | Facility: CLINIC | Age: 54
End: 2019-12-10

## 2019-12-10 NOTE — TELEPHONE ENCOUNTER
Form complete and given to provider for review and signature. Left message for patient requesting waist circumference measurement.

## 2019-12-10 NOTE — TELEPHONE ENCOUNTER
Form signed and returned to triage. Waiting for patient to call back with WC measurement and also form needs his signature.

## 2019-12-10 NOTE — TELEPHONE ENCOUNTER
Neelima Ross dropped off a physician screening form for Dr Leticia Lewis to fill out, gave form to triage.

## 2019-12-11 NOTE — TELEPHONE ENCOUNTER
Daughter, [place] called back and states she will relay message to patient and have him come in for [facility] and signature of form

## 2019-12-12 NOTE — TELEPHONE ENCOUNTER
Spoke with patient  And states wife will  form and he can hand it in after he signs it.    Copy sent to scan    States WC is 41\"

## 2020-02-05 ENCOUNTER — OFFICE VISIT (OUTPATIENT)
Dept: FAMILY MEDICINE CLINIC | Facility: CLINIC | Age: 55
End: 2020-02-05
Payer: COMMERCIAL

## 2020-02-05 VITALS
HEIGHT: 67.32 IN | BODY MASS INDEX: 29.94 KG/M2 | HEART RATE: 72 BPM | OXYGEN SATURATION: 99 % | WEIGHT: 193 LBS | DIASTOLIC BLOOD PRESSURE: 78 MMHG | TEMPERATURE: 97 F | SYSTOLIC BLOOD PRESSURE: 110 MMHG

## 2020-02-05 DIAGNOSIS — J11.1 INFLUENZA-LIKE ILLNESS: Primary | ICD-10-CM

## 2020-02-05 PROCEDURE — 99214 OFFICE O/P EST MOD 30 MIN: CPT | Performed by: FAMILY MEDICINE

## 2020-02-05 RX ORDER — GUAIFENESIN AND CODEINE PHOSPHATE 100; 10 MG/5ML; MG/5ML
5 SOLUTION ORAL 3 TIMES DAILY PRN
Qty: 118 ML | Refills: 0 | Status: SHIPPED | OUTPATIENT
Start: 2020-02-05 | End: 2020-02-19

## 2020-02-05 RX ORDER — ACETAMINOPHEN 500 MG
500 TABLET ORAL EVERY 6 HOURS PRN
COMMUNITY
End: 2021-01-19

## 2020-02-05 RX ORDER — BENZONATATE 200 MG/1
200 CAPSULE ORAL 3 TIMES DAILY PRN
Qty: 20 CAPSULE | Refills: 0 | Status: SHIPPED | OUTPATIENT
Start: 2020-02-05 | End: 2021-01-19 | Stop reason: ALTCHOICE

## 2020-02-05 NOTE — PROGRESS NOTES
CC:  Kayleigh Gilbert is a 47year old male here for Patient presents with:  Cough: Fever, cough with phlegms, body chills and bodyaches x 6 days      HPI:     URI  -started 6 days ago  -associated with cough, chills, aches  -previous treatment: otc meds  -n Procedure Laterality Date   • EYE SURGERY Bilateral     Cataract   • EYE SURGERY Bilateral     Lasik      Social History:    Social History    Tobacco Use      Smoking status: Never Smoker      Smokeless tobacco: Never Used    Alcohol use: Not Currently influenza like illness

## 2020-02-05 NOTE — PATIENT INSTRUCTIONS
--rest, fluids, soups, humidifier, tea with lemon or honey, tylenol/advil as needed for discomfort  -- cough syrup before bed or as needed (no driving as may cause drowsiness)  -- benzonatate as needed for cough (non - drowsy)  -- otc generic mucinex - D

## 2020-02-07 ENCOUNTER — TELEPHONE (OUTPATIENT)
Dept: FAMILY MEDICINE CLINIC | Facility: CLINIC | Age: 55
End: 2020-02-07

## 2020-02-07 NOTE — TELEPHONE ENCOUNTER
Patient called stating that he is still feeling sick and was unable to go to work. Patient is asking for medical excuse for Friday and Saturday. Dr. Johnson Gave approved Medical excuse.

## 2020-03-29 NOTE — LETTER
02/07/20      Patient Name: Carter Palmer          To Whom it may concern: The above patient was seen at the [facility] for treatment of a medical condition. This patient should be excused from attending work 02/07/2020.     The patient negative...

## 2020-08-13 DIAGNOSIS — I10 ESSENTIAL HYPERTENSION: ICD-10-CM

## 2020-08-13 RX ORDER — HYDROCHLOROTHIAZIDE 25 MG/1
TABLET ORAL
Qty: 90 TABLET | Refills: 1 | Status: SHIPPED | OUTPATIENT
Start: 2020-08-13 | End: 2021-01-19

## 2020-08-13 NOTE — TELEPHONE ENCOUNTER
Requested Prescriptions     Pending Prescriptions Disp Refills   • HYDROCHLOROTHIAZIDE 25 MG Oral Tab [Pharmacy Med Name: HYDROCHLOROTHIAZIDE 25MG TABLETS] 90 tablet 1     Sig: TAKE 1 TABLET BY MOUTH EVERY DAY     Last fill was 10/21/19 90 1 refill  Last O

## 2020-09-13 DIAGNOSIS — I10 ESSENTIAL HYPERTENSION: ICD-10-CM

## 2020-09-14 RX ORDER — METOPROLOL SUCCINATE 50 MG/1
TABLET, EXTENDED RELEASE ORAL
Qty: 30 TABLET | Refills: 0 | Status: SHIPPED | OUTPATIENT
Start: 2020-09-14 | End: 2021-01-19

## 2020-09-14 NOTE — TELEPHONE ENCOUNTER
Pt requesting refill of METOPROLOL SUCCINATE ER 50 MG Oral Tablet, approved for 30day supply. Due for appointment     Last Time Medication was Filled:  10/2019 for 6mo supply    Last Office Visit with Provider: 2/5/20    No future appointments.

## 2020-09-16 DIAGNOSIS — I10 ESSENTIAL HYPERTENSION: ICD-10-CM

## 2020-09-16 RX ORDER — METOPROLOL SUCCINATE 50 MG/1
TABLET, EXTENDED RELEASE ORAL
Qty: 90 TABLET | Refills: 0 | OUTPATIENT
Start: 2020-09-16

## 2020-09-16 NOTE — TELEPHONE ENCOUNTER
Pt requesting refill of METOPROLOL SUCCINATE ER 50 MG Oral Tablet, approved for 30day supply. Due for appointment      Last Time Medication was Filled:  9/14/20 qty 30   Last Office Visit with Provider: 2/5/20     No future appointments.

## 2020-11-13 DIAGNOSIS — I10 ESSENTIAL HYPERTENSION: ICD-10-CM

## 2020-11-13 RX ORDER — METOPROLOL SUCCINATE 50 MG/1
TABLET, EXTENDED RELEASE ORAL
Qty: 30 TABLET | Refills: 0 | OUTPATIENT
Start: 2020-11-13

## 2020-11-13 NOTE — TELEPHONE ENCOUNTER
Interpretor ID [id number]    Pt requesting refill of   Requested Prescriptions     Refused Prescriptions Disp Refills   • METOPROLOL SUCCINATE ER 50 MG Oral Tablet 24 Hr [Pharmacy Med Name: METOPROLOL ER SUCCINATE 50MG TABS] 30 tablet 0     Sig: TAKE 1 TABLET(

## 2020-11-19 DIAGNOSIS — I10 ESSENTIAL HYPERTENSION: ICD-10-CM

## 2020-11-20 RX ORDER — METOPROLOL SUCCINATE 50 MG/1
TABLET, EXTENDED RELEASE ORAL
Qty: 90 TABLET | Refills: 0 | OUTPATIENT
Start: 2020-11-20

## 2020-11-20 NOTE — TELEPHONE ENCOUNTER
Pt requesting refill of METOPROLOL SUCCINATE ER 50 MG Oral Tablet 24 Hr    Last Time Medication was Filled:  9/14/20    Last Office Visit with Provider: 2/25/20  No future appointments. Refill denied. Due for appointment.  Left message 11/13/20 to call

## 2021-01-19 ENCOUNTER — OFFICE VISIT (OUTPATIENT)
Dept: FAMILY MEDICINE CLINIC | Facility: CLINIC | Age: 56
End: 2021-01-19
Payer: COMMERCIAL

## 2021-01-19 VITALS
HEART RATE: 56 BPM | TEMPERATURE: 98 F | SYSTOLIC BLOOD PRESSURE: 152 MMHG | OXYGEN SATURATION: 98 % | HEIGHT: 67.09 IN | BODY MASS INDEX: 31.08 KG/M2 | DIASTOLIC BLOOD PRESSURE: 98 MMHG | WEIGHT: 198 LBS

## 2021-01-19 DIAGNOSIS — Z00.00 ROUTINE GENERAL MEDICAL EXAMINATION AT A HEALTH CARE FACILITY: Primary | ICD-10-CM

## 2021-01-19 DIAGNOSIS — Z12.11 ENCOUNTER FOR SCREENING FOR MALIGNANT NEOPLASM OF COLON: ICD-10-CM

## 2021-01-19 DIAGNOSIS — Z12.5 ENCOUNTER FOR SCREENING FOR MALIGNANT NEOPLASM OF PROSTATE: ICD-10-CM

## 2021-01-19 DIAGNOSIS — I10 ESSENTIAL HYPERTENSION: ICD-10-CM

## 2021-01-19 DIAGNOSIS — D22.9 ATYPICAL MOLE: ICD-10-CM

## 2021-01-19 PROCEDURE — 99396 PREV VISIT EST AGE 40-64: CPT | Performed by: FAMILY MEDICINE

## 2021-01-19 PROCEDURE — 3077F SYST BP >= 140 MM HG: CPT | Performed by: FAMILY MEDICINE

## 2021-01-19 PROCEDURE — 99214 OFFICE O/P EST MOD 30 MIN: CPT | Performed by: FAMILY MEDICINE

## 2021-01-19 PROCEDURE — 3080F DIAST BP >= 90 MM HG: CPT | Performed by: FAMILY MEDICINE

## 2021-01-19 PROCEDURE — 3008F BODY MASS INDEX DOCD: CPT | Performed by: FAMILY MEDICINE

## 2021-01-19 RX ORDER — HYDROCHLOROTHIAZIDE 25 MG/1
25 TABLET ORAL DAILY
Qty: 90 TABLET | Refills: 1 | Status: SHIPPED | OUTPATIENT
Start: 2021-01-19 | End: 2021-03-22

## 2021-01-19 RX ORDER — METOPROLOL SUCCINATE 50 MG/1
50 TABLET, EXTENDED RELEASE ORAL DAILY
Qty: 90 TABLET | Refills: 1 | Status: SHIPPED | OUTPATIENT
Start: 2021-01-19 | End: 2021-07-26

## 2021-01-19 RX ORDER — AMLODIPINE BESYLATE 5 MG/1
5 TABLET ORAL DAILY
Qty: 30 TABLET | Refills: 2 | Status: SHIPPED | OUTPATIENT
Start: 2021-01-19 | End: 2021-02-23

## 2021-01-19 NOTE — PROGRESS NOTES
Caty Blair is a 54year old male who is here for Patient presents with:  Hypertension      HPI:     1. Routine general medical examination at a health care facility  2. Encounter for screening for malignant neoplasm of colon  3.  Encounter for screeni hypertension        •  Sildenafil Citrate 100 MG Oral Tab, Take 1 tablet (100 mg total) by mouth daily as needed for Erectile Dysfunction. , Disp: 8 tablet, Rfl: 2    No current facility-administered medications on file prior to visit.        REVIEW OF SYSTE c/w/r  CARDIO: RRR without murmurs  GI: soft, non-tender, non-distended, no appreciable hsm, bs throughout  NEURO: CN II-XII grossly intact  PSYCH: pleasant  MUSCULOSKELETAL: normal gait, no appreciable defects  EXTREMITIES: no cyanosis, clubbing or edema [E]      PSA (Screening) [E]      Meds This Visit:  Requested Prescriptions     Signed Prescriptions Disp Refills   • amLODIPine Besylate 5 MG Oral Tab 30 tablet 2     Sig: Take 1 tablet (5 mg total) by mouth daily.    • Metoprolol Succinate ER 50 MG Oral T

## 2021-01-19 NOTE — PATIENT INSTRUCTIONS
-- schedule appt for fasting bloodwork anytime that you are able to (fast for 8-10 hours minimum, no food. Water is fine). -- go to [email or web address] or call [phone] to schedule an appointment online with Pedro Carney confirm your preferred la

## 2021-02-08 ENCOUNTER — PATIENT OUTREACH (OUTPATIENT)
Dept: FAMILY MEDICINE CLINIC | Facility: CLINIC | Age: 56
End: 2021-02-08

## 2021-02-16 ENCOUNTER — LAB ENCOUNTER (OUTPATIENT)
Dept: LAB | Age: 56
End: 2021-02-16
Attending: FAMILY MEDICINE
Payer: COMMERCIAL

## 2021-02-16 DIAGNOSIS — Z00.00 ROUTINE GENERAL MEDICAL EXAMINATION AT A HEALTH CARE FACILITY: ICD-10-CM

## 2021-02-16 DIAGNOSIS — Z12.5 ENCOUNTER FOR SCREENING FOR MALIGNANT NEOPLASM OF PROSTATE: ICD-10-CM

## 2021-02-16 LAB
ALBUMIN SERPL-MCNC: 3.8 G/DL (ref 3.4–5)
ALBUMIN/GLOB SERPL: 1 {RATIO} (ref 1–2)
ALP LIVER SERPL-CCNC: 79 U/L
ALT SERPL-CCNC: 48 U/L
ANION GAP SERPL CALC-SCNC: 4 MMOL/L (ref 0–18)
AST SERPL-CCNC: 30 U/L (ref 15–37)
BASOPHILS # BLD AUTO: 0.03 X10(3) UL (ref 0–0.2)
BASOPHILS NFR BLD AUTO: 0.6 %
BILIRUB SERPL-MCNC: 0.4 MG/DL (ref 0.1–2)
BUN BLD-MCNC: 14 MG/DL (ref 7–18)
BUN/CREAT SERPL: 14.3 (ref 10–20)
CALCIUM BLD-MCNC: 8.7 MG/DL (ref 8.5–10.1)
CHLORIDE SERPL-SCNC: 109 MMOL/L (ref 98–112)
CHOLEST SMN-MCNC: 156 MG/DL (ref ?–200)
CO2 SERPL-SCNC: 30 MMOL/L (ref 21–32)
COMPLEXED PSA SERPL-MCNC: 1.42 NG/ML (ref ?–4)
CREAT BLD-MCNC: 0.98 MG/DL
DEPRECATED RDW RBC AUTO: 42.5 FL (ref 35.1–46.3)
EOSINOPHIL # BLD AUTO: 0.12 X10(3) UL (ref 0–0.7)
EOSINOPHIL NFR BLD AUTO: 2.5 %
ERYTHROCYTE [DISTWIDTH] IN BLOOD BY AUTOMATED COUNT: 12.3 % (ref 11–15)
GLOBULIN PLAS-MCNC: 3.7 G/DL (ref 2.8–4.4)
GLUCOSE BLD-MCNC: 123 MG/DL (ref 70–99)
HCT VFR BLD AUTO: 45.5 %
HDLC SERPL-MCNC: 29 MG/DL (ref 40–59)
HGB BLD-MCNC: 15.3 G/DL
IMM GRANULOCYTES # BLD AUTO: 0.03 X10(3) UL (ref 0–1)
IMM GRANULOCYTES NFR BLD: 0.6 %
LDLC SERPL CALC-MCNC: 72 MG/DL (ref ?–100)
LYMPHOCYTES # BLD AUTO: 1.65 X10(3) UL (ref 1–4)
LYMPHOCYTES NFR BLD AUTO: 33.9 %
M PROTEIN MFR SERPL ELPH: 7.5 G/DL (ref 6.4–8.2)
MCH RBC QN AUTO: 31.7 PG (ref 26–34)
MCHC RBC AUTO-ENTMCNC: 33.6 G/DL (ref 31–37)
MCV RBC AUTO: 94.2 FL
MONOCYTES # BLD AUTO: 0.6 X10(3) UL (ref 0.1–1)
MONOCYTES NFR BLD AUTO: 12.3 %
NEUTROPHILS # BLD AUTO: 2.44 X10 (3) UL (ref 1.5–7.7)
NEUTROPHILS # BLD AUTO: 2.44 X10(3) UL (ref 1.5–7.7)
NEUTROPHILS NFR BLD AUTO: 50.1 %
NONHDLC SERPL-MCNC: 127 MG/DL (ref ?–130)
OSMOLALITY SERPL CALC.SUM OF ELEC: 298 MOSM/KG (ref 275–295)
PATIENT FASTING Y/N/NP: YES
PATIENT FASTING Y/N/NP: YES
PLATELET # BLD AUTO: 222 10(3)UL (ref 150–450)
POTASSIUM SERPL-SCNC: 4.2 MMOL/L (ref 3.5–5.1)
RBC # BLD AUTO: 4.83 X10(6)UL
SODIUM SERPL-SCNC: 143 MMOL/L (ref 136–145)
TRIGL SERPL-MCNC: 277 MG/DL (ref 30–149)
TSI SER-ACNC: 1.54 MIU/ML (ref 0.36–3.74)
VLDLC SERPL CALC-MCNC: 55 MG/DL (ref 0–30)
WBC # BLD AUTO: 4.9 X10(3) UL (ref 4–11)

## 2021-02-16 PROCEDURE — 80061 LIPID PANEL: CPT

## 2021-02-16 PROCEDURE — 80053 COMPREHEN METABOLIC PANEL: CPT

## 2021-02-16 PROCEDURE — 36415 COLL VENOUS BLD VENIPUNCTURE: CPT

## 2021-02-16 PROCEDURE — 84443 ASSAY THYROID STIM HORMONE: CPT

## 2021-02-16 PROCEDURE — 85025 COMPLETE CBC W/AUTO DIFF WBC: CPT

## 2021-02-20 ENCOUNTER — LAB ENCOUNTER (OUTPATIENT)
Dept: LAB | Age: 56
End: 2021-02-20
Attending: STUDENT IN AN ORGANIZED HEALTH CARE EDUCATION/TRAINING PROGRAM
Payer: COMMERCIAL

## 2021-02-20 DIAGNOSIS — Z01.818 PRE-OP TESTING: ICD-10-CM

## 2021-02-21 LAB — SARS-COV-2 RNA RESP QL NAA+PROBE: NOT DETECTED

## 2021-02-23 ENCOUNTER — OFFICE VISIT (OUTPATIENT)
Dept: FAMILY MEDICINE CLINIC | Facility: CLINIC | Age: 56
End: 2021-02-23
Payer: COMMERCIAL

## 2021-02-23 VITALS
DIASTOLIC BLOOD PRESSURE: 80 MMHG | SYSTOLIC BLOOD PRESSURE: 132 MMHG | HEART RATE: 78 BPM | BODY MASS INDEX: 30.4 KG/M2 | HEIGHT: 67.32 IN | OXYGEN SATURATION: 98 % | WEIGHT: 196 LBS | TEMPERATURE: 98 F

## 2021-02-23 DIAGNOSIS — R73.9 HYPERGLYCEMIA: ICD-10-CM

## 2021-02-23 DIAGNOSIS — I10 ESSENTIAL HYPERTENSION: Primary | ICD-10-CM

## 2021-02-23 LAB
CARTRIDGE LOT#: 726 NUMERIC
HEMOGLOBIN A1C: 6.3 % (ref 4.3–5.6)

## 2021-02-23 PROCEDURE — 3079F DIAST BP 80-89 MM HG: CPT | Performed by: FAMILY MEDICINE

## 2021-02-23 PROCEDURE — 99214 OFFICE O/P EST MOD 30 MIN: CPT | Performed by: FAMILY MEDICINE

## 2021-02-23 PROCEDURE — 3008F BODY MASS INDEX DOCD: CPT | Performed by: FAMILY MEDICINE

## 2021-02-23 PROCEDURE — 3075F SYST BP GE 130 - 139MM HG: CPT | Performed by: FAMILY MEDICINE

## 2021-02-23 PROCEDURE — 83036 HEMOGLOBIN GLYCOSYLATED A1C: CPT | Performed by: FAMILY MEDICINE

## 2021-02-23 NOTE — PROGRESS NOTES
Jaleesa Bingham is a 54year old male here for Patient presents with:  Hypertension: Follow up   Lab Results: Labs done 02/20/2021      HPI:       1.  Essential hypertension  -bp better  -not taking amlodipine as bp at home running mostly under 140s  -feels 2/23/2021 ) 30 tablet 2   • Sildenafil Citrate 100 MG Oral Tab Take 1 tablet (100 mg total) by mouth daily as needed for Erectile Dysfunction.  (Patient not taking: Reported on 2/23/2021 ) 8 tablet 2       Allergies:    Cefaclor                RASH      ROS

## 2021-02-23 NOTE — PATIENT INSTRUCTIONS
Menos carbohidratos (pan, tortillas, arroz, dinorah)  Menos nirmala  Mas frutas y vegetables  Mas ejercisios    Sigue wagner presion 3 veces al semana    Regresa en 3 meses

## 2021-02-27 ENCOUNTER — LAB ENCOUNTER (OUTPATIENT)
Dept: LAB | Age: 56
End: 2021-02-27
Attending: STUDENT IN AN ORGANIZED HEALTH CARE EDUCATION/TRAINING PROGRAM
Payer: COMMERCIAL

## 2021-02-27 DIAGNOSIS — Z01.818 PRE-OP TESTING: ICD-10-CM

## 2021-02-28 LAB — SARS-COV-2 RNA RESP QL NAA+PROBE: NOT DETECTED

## 2021-03-01 ENCOUNTER — TELEPHONE (OUTPATIENT)
Dept: FAMILY MEDICINE CLINIC | Facility: CLINIC | Age: 56
End: 2021-03-01

## 2021-03-01 NOTE — TELEPHONE ENCOUNTER
Spoke with Wife and advised they call GI for prepping supplies and further instructions.    She agreed with plan

## 2021-03-01 NOTE — TELEPHONE ENCOUNTER
Pt states he had to reschedule his colonoscopy due to not being ready for it and now pt states he needs to prep an d the stuff he needs to drink prior to the colonoscopy needs to be authorized by Bishop Gonzalez to the pharmacy.  Pt is Egyptian speaking pt also sta

## 2021-03-02 PROBLEM — Z12.11 SPECIAL SCREENING FOR MALIGNANT NEOPLASM OF COLON: Status: ACTIVE | Noted: 2021-03-02

## 2021-03-02 PROBLEM — D12.2 BENIGN NEOPLASM OF ASCENDING COLON: Status: ACTIVE | Noted: 2021-03-02

## 2021-03-02 PROBLEM — D12.8 BENIGN NEOPLASM OF RECTUM: Status: ACTIVE | Noted: 2021-03-02

## 2021-03-02 PROBLEM — K64.2 THIRD DEGREE HEMORRHOIDS: Status: ACTIVE | Noted: 2021-03-02

## 2021-03-11 DIAGNOSIS — I10 ESSENTIAL HYPERTENSION: ICD-10-CM

## 2021-03-11 RX ORDER — HYDROCHLOROTHIAZIDE 25 MG/1
TABLET ORAL
Qty: 90 TABLET | Refills: 1 | OUTPATIENT
Start: 2021-03-11

## 2021-03-11 NOTE — TELEPHONE ENCOUNTER
Requested Prescriptions     Refused Prescriptions Disp Refills   • HYDROCHLOROTHIAZIDE 25 MG Oral Tab [Pharmacy Med Name: HYDROCHLOROTHIAZIDE 25MG TABLETS] 90 tablet 1     Sig: TAKE 1 TABLET BY MOUTH EVERY DAY     Refused By: Zenon Ramirez     Reason for R

## 2021-03-21 DIAGNOSIS — I10 ESSENTIAL HYPERTENSION: ICD-10-CM

## 2021-03-22 RX ORDER — HYDROCHLOROTHIAZIDE 25 MG/1
TABLET ORAL
Qty: 90 TABLET | Refills: 1 | Status: SHIPPED | OUTPATIENT
Start: 2021-03-22 | End: 2021-09-23

## 2021-07-26 DIAGNOSIS — I10 ESSENTIAL HYPERTENSION: ICD-10-CM

## 2021-07-26 RX ORDER — METOPROLOL SUCCINATE 50 MG/1
TABLET, EXTENDED RELEASE ORAL
Qty: 30 TABLET | Refills: 0 | Status: SHIPPED | OUTPATIENT
Start: 2021-07-26 | End: 2021-09-01

## 2021-07-26 NOTE — TELEPHONE ENCOUNTER
Pt requesting refill of Metoprolol    Passed protocol, refill approved, sent to pharmacy. Last Office Visit with Provider: 2/23/21. Follow up in 3 months  No future appointments.   [employer] message sent

## 2021-08-25 DIAGNOSIS — I10 ESSENTIAL HYPERTENSION: ICD-10-CM

## 2021-08-25 RX ORDER — METOPROLOL SUCCINATE 50 MG/1
TABLET, EXTENDED RELEASE ORAL
Qty: 30 TABLET | Refills: 0 | OUTPATIENT
Start: 2021-08-25

## 2021-08-25 NOTE — TELEPHONE ENCOUNTER
Requested Prescriptions     Refused Prescriptions Disp Refills   • METOPROLOL SUCCINATE 50 MG Oral Tablet 24 Hr [Pharmacy Med Name: METOPROLOL ER SUCCINATE 50MG TABS] 30 tablet 0     Sig: TAKE 1 TABLET(50 MG) BY MOUTH DAILY     Refused By: Radha James

## 2021-09-01 ENCOUNTER — TELEPHONE (OUTPATIENT)
Dept: FAMILY MEDICINE CLINIC | Facility: CLINIC | Age: 56
End: 2021-09-01

## 2021-09-01 DIAGNOSIS — I10 ESSENTIAL HYPERTENSION: ICD-10-CM

## 2021-09-01 RX ORDER — METOPROLOL SUCCINATE 50 MG/1
50 TABLET, EXTENDED RELEASE ORAL DAILY
Qty: 30 TABLET | Refills: 0 | Status: SHIPPED | OUTPATIENT
Start: 2021-09-01 | End: 2021-09-23

## 2021-09-01 NOTE — TELEPHONE ENCOUNTER
LOV 2/23/21  Follow up in 3 months  No future OV     Spoke with daughter and advised we sent him a mychart in July advising he is due for follow up. She VU and made an appointment for him 9/23/21.    #30 approved until appointment

## 2021-09-01 NOTE — TELEPHONE ENCOUNTER
Maya (daughter) is calling to get a refill on his Metroprolo sent to his [address] in [facility] [place], he is leaving for [facility] tomorrow and he has no more pills and no refills.  Questions or concerns please call Maya at [phone]

## 2021-09-23 ENCOUNTER — OFFICE VISIT (OUTPATIENT)
Dept: FAMILY MEDICINE CLINIC | Facility: CLINIC | Age: 56
End: 2021-09-23
Payer: COMMERCIAL

## 2021-09-23 VITALS
DIASTOLIC BLOOD PRESSURE: 80 MMHG | RESPIRATION RATE: 20 BRPM | WEIGHT: 195 LBS | SYSTOLIC BLOOD PRESSURE: 138 MMHG | TEMPERATURE: 98 F | HEIGHT: 67.32 IN | HEART RATE: 59 BPM | OXYGEN SATURATION: 97 % | BODY MASS INDEX: 30.25 KG/M2

## 2021-09-23 DIAGNOSIS — I10 ESSENTIAL HYPERTENSION: ICD-10-CM

## 2021-09-23 PROCEDURE — 3079F DIAST BP 80-89 MM HG: CPT | Performed by: FAMILY MEDICINE

## 2021-09-23 PROCEDURE — 3008F BODY MASS INDEX DOCD: CPT | Performed by: FAMILY MEDICINE

## 2021-09-23 PROCEDURE — 99214 OFFICE O/P EST MOD 30 MIN: CPT | Performed by: FAMILY MEDICINE

## 2021-09-23 PROCEDURE — 3075F SYST BP GE 130 - 139MM HG: CPT | Performed by: FAMILY MEDICINE

## 2021-09-23 RX ORDER — METOPROLOL SUCCINATE 50 MG/1
50 TABLET, EXTENDED RELEASE ORAL DAILY
Qty: 90 TABLET | Refills: 1 | Status: SHIPPED | OUTPATIENT
Start: 2021-09-23

## 2021-09-23 RX ORDER — HYDROCHLOROTHIAZIDE 25 MG/1
25 TABLET ORAL DAILY
Qty: 90 TABLET | Refills: 1 | Status: SHIPPED | OUTPATIENT
Start: 2021-09-23

## 2021-09-23 NOTE — PROGRESS NOTES
Loc Harris is a 64year old male here for Patient presents with:  Blood Pressure      HPI:       1.  Essential hypertension  -here for f/u   -needs refills  -tolerating meds without issues  -no longer taking amlodipine as bp remains controlled at home w Allergies:    Cefaclor                RASH      ROS:   See HPI for relevant ROS    --GEN: No other complaints  --HEENT: No other complaints  --RESP: No other complaints  --CV: No other complaints  --GI: No other complaints  --: No other complaints

## 2021-09-23 NOTE — PATIENT INSTRUCTIONS
Continua todo wagner medicamentos    Llama si wagner presion esta subiendo    Regresa en 6 meses, antes si necessita

## 2021-09-24 DIAGNOSIS — I10 ESSENTIAL HYPERTENSION: ICD-10-CM

## 2021-09-24 RX ORDER — HYDROCHLOROTHIAZIDE 25 MG/1
TABLET ORAL
Qty: 90 TABLET | Refills: 1 | OUTPATIENT
Start: 2021-09-24

## 2021-09-24 RX ORDER — METOPROLOL SUCCINATE 50 MG/1
TABLET, EXTENDED RELEASE ORAL
Qty: 30 TABLET | Refills: 0 | OUTPATIENT
Start: 2021-09-24

## 2021-09-24 NOTE — TELEPHONE ENCOUNTER
Requested Prescriptions     Refused Prescriptions Disp Refills   • HYDROCHLOROTHIAZIDE 25 MG Oral Tab [Pharmacy Med Name: HYDROCHLOROTHIAZIDE 25MG TABLETS] 90 tablet 1     Sig: TAKE 1 TABLET(25 MG) BY MOUTH DAILY     Refused By: Bc Landaverde     Reason fo

## 2022-03-03 RX ORDER — METOPROLOL SUCCINATE 50 MG/1
TABLET, EXTENDED RELEASE ORAL
Qty: 30 TABLET | Refills: 0 | Status: SHIPPED | OUTPATIENT
Start: 2022-03-03 | End: 2022-04-01

## 2022-03-06 RX ORDER — HYDROCHLOROTHIAZIDE 25 MG/1
TABLET ORAL
Qty: 30 TABLET | Refills: 0 | Status: SHIPPED | OUTPATIENT
Start: 2022-03-06

## 2022-04-01 RX ORDER — METOPROLOL SUCCINATE 50 MG/1
TABLET, EXTENDED RELEASE ORAL
Qty: 90 TABLET | Refills: 0 | Status: SHIPPED | OUTPATIENT
Start: 2022-04-01

## 2022-04-01 NOTE — TELEPHONE ENCOUNTER
Spoke with daughter Maya- on HIPAA- She scheduled appointment for Wellness visit for patient for 06/13/2022

## 2022-04-01 NOTE — TELEPHONE ENCOUNTER
Pt requesting refill of METOPROLOL ER SUCCINATE 50MG TABS    Last Time Medication was Prescribed :  03/03/2022 qty #30 with 0 refills    Last Office Visit with Provider: 09/23/2021    Recommended to return by Provider: 6 months    No future appointments.      Passed protocol, refill approved, sent to pharmacy  Hypertension Medications Protocol Failed 04/01/2022 05:52 AM   Protocol Details  CMP or BMP in past 12 months    Appointment in past 6 or next 3 months    Last serum creatinine< 2.0

## 2022-05-06 RX ORDER — HYDROCHLOROTHIAZIDE 25 MG/1
TABLET ORAL
Qty: 30 TABLET | Refills: 0 | Status: SHIPPED | OUTPATIENT
Start: 2022-05-06

## 2022-06-04 DIAGNOSIS — I10 ESSENTIAL HYPERTENSION: ICD-10-CM

## 2022-06-06 RX ORDER — HYDROCHLOROTHIAZIDE 25 MG/1
TABLET ORAL
Qty: 30 TABLET | Refills: 0 | Status: SHIPPED | OUTPATIENT
Start: 2022-06-06

## 2022-06-21 ENCOUNTER — OFFICE VISIT (OUTPATIENT)
Dept: FAMILY MEDICINE CLINIC | Facility: CLINIC | Age: 57
End: 2022-06-21
Payer: COMMERCIAL

## 2022-06-21 VITALS
DIASTOLIC BLOOD PRESSURE: 80 MMHG | BODY MASS INDEX: 29.32 KG/M2 | SYSTOLIC BLOOD PRESSURE: 138 MMHG | HEART RATE: 76 BPM | HEIGHT: 67.32 IN | WEIGHT: 189 LBS | TEMPERATURE: 98 F | OXYGEN SATURATION: 98 %

## 2022-06-21 DIAGNOSIS — Z13.0 SCREENING FOR ENDOCRINE, NUTRITIONAL, METABOLIC AND IMMUNITY DISORDER: ICD-10-CM

## 2022-06-21 DIAGNOSIS — Z13.228 SCREENING FOR ENDOCRINE, NUTRITIONAL, METABOLIC AND IMMUNITY DISORDER: ICD-10-CM

## 2022-06-21 DIAGNOSIS — Z13.6 SCREENING FOR CARDIOVASCULAR CONDITION: ICD-10-CM

## 2022-06-21 DIAGNOSIS — Z12.5 SCREENING FOR MALIGNANT NEOPLASM OF PROSTATE: ICD-10-CM

## 2022-06-21 DIAGNOSIS — N52.8 OTHER MALE ERECTILE DYSFUNCTION: ICD-10-CM

## 2022-06-21 DIAGNOSIS — I10 ESSENTIAL HYPERTENSION: ICD-10-CM

## 2022-06-21 DIAGNOSIS — Z00.00 ROUTINE GENERAL MEDICAL EXAMINATION AT A HEALTH CARE FACILITY: Primary | ICD-10-CM

## 2022-06-21 DIAGNOSIS — Z13.21 SCREENING FOR ENDOCRINE, NUTRITIONAL, METABOLIC AND IMMUNITY DISORDER: ICD-10-CM

## 2022-06-21 DIAGNOSIS — Z13.29 SCREENING FOR ENDOCRINE, NUTRITIONAL, METABOLIC AND IMMUNITY DISORDER: ICD-10-CM

## 2022-06-21 PROCEDURE — 99396 PREV VISIT EST AGE 40-64: CPT | Performed by: FAMILY MEDICINE

## 2022-06-21 PROCEDURE — 3079F DIAST BP 80-89 MM HG: CPT | Performed by: FAMILY MEDICINE

## 2022-06-21 PROCEDURE — 99214 OFFICE O/P EST MOD 30 MIN: CPT | Performed by: FAMILY MEDICINE

## 2022-06-21 PROCEDURE — 3008F BODY MASS INDEX DOCD: CPT | Performed by: FAMILY MEDICINE

## 2022-06-21 PROCEDURE — 3075F SYST BP GE 130 - 139MM HG: CPT | Performed by: FAMILY MEDICINE

## 2022-06-21 RX ORDER — TADALAFIL 20 MG/1
TABLET ORAL
Qty: 20 TABLET | Refills: 0 | Status: SHIPPED | OUTPATIENT
Start: 2022-06-21

## 2022-06-21 RX ORDER — METOPROLOL SUCCINATE 50 MG/1
50 TABLET, EXTENDED RELEASE ORAL DAILY
Qty: 90 TABLET | Refills: 1 | Status: SHIPPED | OUTPATIENT
Start: 2022-06-21

## 2022-06-21 RX ORDER — HYDROCHLOROTHIAZIDE 25 MG/1
25 TABLET ORAL DAILY
Qty: 90 TABLET | Refills: 1 | Status: SHIPPED | OUTPATIENT
Start: 2022-06-21

## 2022-09-15 ENCOUNTER — TELEPHONE (OUTPATIENT)
Dept: FAMILY MEDICINE CLINIC | Facility: CLINIC | Age: 57
End: 2022-09-15

## 2022-09-15 LAB
A/G RATIO: 1.8
ALBUMIN: 4.4 G/DL
ALKALINE PHOSPHATASE: 81
ALT: 27
AST: 24
BILIRUBIN, TOTAL: 0.5
BUN/CREATININE RATIO: 14
BUN: 12
CALCIUM: 9.2
CARBON DIOXIDE: 26
CHLORIDE: 101
CHOLESTEROL: 175
CREATININE: 0.88 MG/DL
EGFR: 100
GLOBULIN: 2.4
GLUCOSE: 109
HDL CHOLESTEROL: 28 MG/DL
HEMATOCRIT: 45.1 %
HEMOGLOBIN: 15.2 G/DL (ref 13–17)
LDL CHOLESTEROL CALC: 86 MG/DL
MCH: 31.5 PG
MCHC: 33.7 G/DL
MCV: 94
PLATELETS: 237 X10E3/UL
POTASSIUM: 4.2
PROTEIN, TOTAL: 6.8
PSA, TOTAL: 1.6
RBC: 4.82
RDW: 13.1 %
SODIUM: 143
TRIGLYCERIDES: 373 MG/DL
TSH: 0.72 UIU/ML
VLDL CHOLESTEROL CAL: 61 MG/DL
WBC: 5

## 2022-09-15 NOTE — TELEPHONE ENCOUNTER
Pt's daughter called and states he needs order for labs.  He is at [facility] in [place] now   Fax [phone]

## 2022-09-19 ENCOUNTER — TELEPHONE (OUTPATIENT)
Dept: FAMILY MEDICINE CLINIC | Facility: CLINIC | Age: 57
End: 2022-09-19

## 2022-09-26 ENCOUNTER — OFFICE VISIT (OUTPATIENT)
Dept: FAMILY MEDICINE CLINIC | Facility: CLINIC | Age: 57
End: 2022-09-26

## 2022-09-26 DIAGNOSIS — R73.9 HYPERGLYCEMIA: ICD-10-CM

## 2022-09-26 DIAGNOSIS — I10 ESSENTIAL HYPERTENSION: Primary | ICD-10-CM

## 2022-09-26 PROCEDURE — 3008F BODY MASS INDEX DOCD: CPT | Performed by: FAMILY MEDICINE

## 2022-09-26 PROCEDURE — 99215 OFFICE O/P EST HI 40 MIN: CPT | Performed by: FAMILY MEDICINE

## 2022-09-26 PROCEDURE — 3075F SYST BP GE 130 - 139MM HG: CPT | Performed by: FAMILY MEDICINE

## 2022-09-26 PROCEDURE — 3079F DIAST BP 80-89 MM HG: CPT | Performed by: FAMILY MEDICINE

## 2022-09-28 VITALS
TEMPERATURE: 98 F | HEART RATE: 64 BPM | DIASTOLIC BLOOD PRESSURE: 82 MMHG | WEIGHT: 188 LBS | HEIGHT: 67.32 IN | OXYGEN SATURATION: 96 % | BODY MASS INDEX: 29.16 KG/M2 | SYSTOLIC BLOOD PRESSURE: 138 MMHG

## 2022-12-28 DIAGNOSIS — I10 ESSENTIAL HYPERTENSION: ICD-10-CM

## 2022-12-29 RX ORDER — METOPROLOL SUCCINATE 50 MG/1
TABLET, EXTENDED RELEASE ORAL
Qty: 30 TABLET | Refills: 0 | Status: SHIPPED | OUTPATIENT
Start: 2022-12-29

## 2023-01-18 DIAGNOSIS — I10 ESSENTIAL HYPERTENSION: ICD-10-CM

## 2023-01-19 RX ORDER — HYDROCHLOROTHIAZIDE 25 MG/1
TABLET ORAL
Qty: 90 TABLET | Refills: 0 | Status: SHIPPED | OUTPATIENT
Start: 2023-01-19

## 2023-01-27 DIAGNOSIS — I10 ESSENTIAL HYPERTENSION: ICD-10-CM

## 2023-01-27 RX ORDER — METOPROLOL SUCCINATE 50 MG/1
TABLET, EXTENDED RELEASE ORAL
Qty: 30 TABLET | Refills: 0 | Status: SHIPPED | OUTPATIENT
Start: 2023-01-27

## 2023-02-26 DIAGNOSIS — I10 ESSENTIAL HYPERTENSION: ICD-10-CM

## 2023-02-27 RX ORDER — METOPROLOL SUCCINATE 50 MG/1
TABLET, EXTENDED RELEASE ORAL
Qty: 30 TABLET | Refills: 0 | Status: SHIPPED | OUTPATIENT
Start: 2023-02-27

## 2023-03-28 DIAGNOSIS — I10 ESSENTIAL HYPERTENSION: ICD-10-CM

## 2023-03-28 RX ORDER — METOPROLOL SUCCINATE 50 MG/1
TABLET, EXTENDED RELEASE ORAL
Qty: 15 TABLET | Refills: 0 | Status: SHIPPED | OUTPATIENT
Start: 2023-03-28

## 2023-04-27 DIAGNOSIS — I10 ESSENTIAL HYPERTENSION: ICD-10-CM

## 2023-04-28 RX ORDER — HYDROCHLOROTHIAZIDE 25 MG/1
TABLET ORAL
Qty: 30 TABLET | Refills: 0 | Status: SHIPPED | OUTPATIENT
Start: 2023-04-28

## 2023-05-18 ENCOUNTER — TELEPHONE (OUTPATIENT)
Dept: FAMILY MEDICINE CLINIC | Facility: CLINIC | Age: 58
End: 2023-05-18

## 2023-05-18 NOTE — TELEPHONE ENCOUNTER
Patient states he does not have insurance coverage, is self pay , states he does not have money for visit     will call back in 2 weeks to schedule appt. States if he can only have one refill, until he's back on his feet. He had to leave town for an emergency.

## 2023-05-18 NOTE — TELEPHONE ENCOUNTER
Patient would like refills on medication, states he was in [facility] and unable to .    HYDROCHLOROTHIAZIDE 25 MG Oral Tab    METOPROLOL SUCCINATE ER 50 MG Oral Tablet 24 Hr

## 2023-05-25 ENCOUNTER — OFFICE VISIT (OUTPATIENT)
Dept: FAMILY MEDICINE CLINIC | Facility: CLINIC | Age: 58
End: 2023-05-25
Payer: COMMERCIAL

## 2023-05-25 VITALS
OXYGEN SATURATION: 98 % | HEART RATE: 54 BPM | BODY MASS INDEX: 29.78 KG/M2 | SYSTOLIC BLOOD PRESSURE: 138 MMHG | DIASTOLIC BLOOD PRESSURE: 84 MMHG | WEIGHT: 192 LBS | HEIGHT: 67.32 IN | TEMPERATURE: 98 F

## 2023-05-25 DIAGNOSIS — I10 ESSENTIAL HYPERTENSION: Primary | ICD-10-CM

## 2023-05-25 DIAGNOSIS — N52.8 OTHER MALE ERECTILE DYSFUNCTION: ICD-10-CM

## 2023-05-25 DIAGNOSIS — R73.9 HYPERGLYCEMIA: ICD-10-CM

## 2023-05-25 PROCEDURE — 3075F SYST BP GE 130 - 139MM HG: CPT | Performed by: FAMILY MEDICINE

## 2023-05-25 PROCEDURE — 99214 OFFICE O/P EST MOD 30 MIN: CPT | Performed by: FAMILY MEDICINE

## 2023-05-25 PROCEDURE — 3079F DIAST BP 80-89 MM HG: CPT | Performed by: FAMILY MEDICINE

## 2023-05-25 PROCEDURE — 3008F BODY MASS INDEX DOCD: CPT | Performed by: FAMILY MEDICINE

## 2023-05-25 RX ORDER — HYDROCHLOROTHIAZIDE 25 MG/1
25 TABLET ORAL DAILY
Qty: 90 TABLET | Refills: 1 | Status: SHIPPED | OUTPATIENT
Start: 2023-05-25

## 2023-05-25 RX ORDER — METOPROLOL SUCCINATE 50 MG/1
50 TABLET, EXTENDED RELEASE ORAL DAILY
Qty: 90 TABLET | Refills: 1 | Status: SHIPPED | OUTPATIENT
Start: 2023-05-25

## 2023-05-25 NOTE — PATIENT INSTRUCTIONS
Puede buscar por Vitamin D 2000 units diario y    Glucosamine with chondroitin diario    Continua todo vincenzo medicamentos para wagner presion    Regresa en 3 meses, antes si necessita

## 2023-08-25 ENCOUNTER — TELEPHONE (OUTPATIENT)
Dept: FAMILY MEDICINE CLINIC | Facility: CLINIC | Age: 58
End: 2023-08-25

## 2023-08-25 NOTE — TELEPHONE ENCOUNTER
Calixto Ascencio requesting medication refill for hydroCHLOROthiazide 25 MG Oral Tab . metoprolol succinate ER 50 MG Oral Tablet 24 Hr     Patient [address] (Yes/No): no  LOV: 5/25/2023   Last Refill Date:   27 or 80 Day Supply:  Pharmacy Location: [facility] on file  Prescribed By: Dr. Jodene Spatz  Next Scheduled Appointment: 8/28/2023    Only has 1 pill left      Informed patient to allow up to 24 to 48 Business hours for a call back from a nurse.

## 2023-08-25 NOTE — TELEPHONE ENCOUNTER
On  5/25/23 90 with 1 refill was sent to the pharmacy for both of these meds. Pt should have the 1 refill left.   Left msg for pt asking him to contact the pharmacy to discuss and if any issues office returns on Monday

## 2023-08-28 ENCOUNTER — OFFICE VISIT (OUTPATIENT)
Dept: FAMILY MEDICINE CLINIC | Facility: CLINIC | Age: 58
End: 2023-08-28
Payer: COMMERCIAL

## 2023-08-28 VITALS
SYSTOLIC BLOOD PRESSURE: 132 MMHG | BODY MASS INDEX: 29.32 KG/M2 | HEART RATE: 68 BPM | DIASTOLIC BLOOD PRESSURE: 60 MMHG | TEMPERATURE: 97 F | HEIGHT: 67.13 IN | WEIGHT: 189 LBS | OXYGEN SATURATION: 98 %

## 2023-08-28 DIAGNOSIS — G89.29 CHRONIC PAIN OF BOTH SHOULDERS: ICD-10-CM

## 2023-08-28 DIAGNOSIS — M25.60 MORNING STIFFNESS OF JOINTS: ICD-10-CM

## 2023-08-28 DIAGNOSIS — Z12.5 ENCOUNTER FOR SCREENING FOR MALIGNANT NEOPLASM OF PROSTATE: ICD-10-CM

## 2023-08-28 DIAGNOSIS — R73.9 HYPERGLYCEMIA: ICD-10-CM

## 2023-08-28 DIAGNOSIS — Z00.00 ROUTINE GENERAL MEDICAL EXAMINATION AT A HEALTH CARE FACILITY: Primary | ICD-10-CM

## 2023-08-28 DIAGNOSIS — M25.512 CHRONIC PAIN OF BOTH SHOULDERS: ICD-10-CM

## 2023-08-28 DIAGNOSIS — M25.511 CHRONIC PAIN OF BOTH SHOULDERS: ICD-10-CM

## 2023-08-28 DIAGNOSIS — N52.8 OTHER MALE ERECTILE DYSFUNCTION: ICD-10-CM

## 2023-08-28 DIAGNOSIS — I10 ESSENTIAL HYPERTENSION: ICD-10-CM

## 2023-08-28 RX ORDER — NABUMETONE 500 MG/1
500 TABLET, FILM COATED ORAL 2 TIMES DAILY PRN
Qty: 30 TABLET | Refills: 1 | Status: SHIPPED | OUTPATIENT
Start: 2023-08-28

## 2023-08-28 RX ORDER — TADALAFIL 20 MG/1
TABLET ORAL
Qty: 20 TABLET | Refills: 0 | Status: SHIPPED | OUTPATIENT
Start: 2023-08-28

## 2023-08-30 ENCOUNTER — TELEPHONE (OUTPATIENT)
Dept: FAMILY MEDICINE CLINIC | Facility: CLINIC | Age: 58
End: 2023-08-30

## 2023-08-30 DIAGNOSIS — I10 ESSENTIAL HYPERTENSION: ICD-10-CM

## 2023-08-30 RX ORDER — METOPROLOL SUCCINATE 50 MG/1
50 TABLET, EXTENDED RELEASE ORAL DAILY
Qty: 90 TABLET | Refills: 1 | Status: SHIPPED | OUTPATIENT
Start: 2023-08-30

## 2023-12-05 ENCOUNTER — TELEPHONE (OUTPATIENT)
Dept: FAMILY MEDICINE CLINIC | Facility: CLINIC | Age: 58
End: 2023-12-05

## 2023-12-05 DIAGNOSIS — I10 ESSENTIAL HYPERTENSION: ICD-10-CM

## 2023-12-05 RX ORDER — HYDROCHLOROTHIAZIDE 25 MG/1
25 TABLET ORAL DAILY
Qty: 90 TABLET | Refills: 0 | Status: SHIPPED | OUTPATIENT
Start: 2023-12-05

## 2023-12-05 NOTE — TELEPHONE ENCOUNTER
Metoprolol sent on 8/30/23 for 90 with 1 refill  (refill too soon request)    Hydrochlorothiazide sent in today for 90 days

## 2023-12-05 NOTE — TELEPHONE ENCOUNTER
Denver Kindle requesting medication refill for     metoprolol succinate ER 50 MG Oral Tablet 24 Hr .  hydroCHLOROthiazide 25 MG Oral Tab       Patient Contacted Pharmacy (Yes/No): no   LOV: 8/28/2023   Last Refill Date: 8/30/2023  30 or 90 Day Supply: 90   Pharmacy Location: [id number], [address] AT [address] [place] [address], [phone], [phone] [[id number]]   Prescribed By: Prosper Gonzalez     Informed patient to allow up to 24 to 50 Business hours for a call back from a nurse.

## 2024-02-17 DIAGNOSIS — I10 ESSENTIAL HYPERTENSION: ICD-10-CM

## 2024-02-19 DIAGNOSIS — I10 ESSENTIAL HYPERTENSION: ICD-10-CM

## 2024-02-19 RX ORDER — HYDROCHLOROTHIAZIDE 25 MG/1
25 TABLET ORAL DAILY
Qty: 30 TABLET | Refills: 0 | Status: SHIPPED | OUTPATIENT
Start: 2024-02-19

## 2024-02-19 RX ORDER — HYDROCHLOROTHIAZIDE 25 MG/1
25 TABLET ORAL DAILY
Qty: 90 TABLET | Refills: 0 | OUTPATIENT
Start: 2024-02-19

## 2024-02-19 NOTE — TELEPHONE ENCOUNTER
Called patient LVM notifying him of labs that need to be done, order placed on 8/23. Encouraged to call back with any questions.

## 2024-03-21 ENCOUNTER — MED REC SCAN ONLY (OUTPATIENT)
Dept: FAMILY MEDICINE CLINIC | Facility: CLINIC | Age: 59
End: 2024-03-21

## 2024-05-20 DIAGNOSIS — I10 ESSENTIAL HYPERTENSION: ICD-10-CM

## 2024-05-22 RX ORDER — HYDROCHLOROTHIAZIDE 25 MG/1
25 TABLET ORAL DAILY
Qty: 30 TABLET | Refills: 0 | Status: SHIPPED | OUTPATIENT
Start: 2024-05-22

## 2024-06-01 DIAGNOSIS — I10 ESSENTIAL HYPERTENSION: ICD-10-CM

## 2024-06-03 DIAGNOSIS — I10 ESSENTIAL HYPERTENSION: ICD-10-CM

## 2024-06-03 RX ORDER — METOPROLOL SUCCINATE 50 MG/1
50 TABLET, EXTENDED RELEASE ORAL DAILY
Qty: 90 TABLET | Refills: 0 | OUTPATIENT
Start: 2024-06-03

## 2024-06-03 RX ORDER — METOPROLOL SUCCINATE 50 MG/1
50 TABLET, EXTENDED RELEASE ORAL DAILY
Qty: 30 TABLET | Refills: 0 | Status: SHIPPED | OUTPATIENT
Start: 2024-06-03

## 2024-06-03 NOTE — TELEPHONE ENCOUNTER
Duplicate request. Refilled earlier today.    Requested Prescriptions     Refused Prescriptions Disp Refills    METOPROLOL SUCCINATE ER 50 MG Oral Tablet 24 Hr [Pharmacy Med Name: METOPROLOL ER SUCCINATE 50MG TABS] 90 tablet 0     Sig: TAKE 1 TABLET(50 MG) BY MOUTH DAILY     Refused By: APOLONIA KELLY     Reason for Refusal: Duplicate refill request

## 2024-06-11 DIAGNOSIS — I10 ESSENTIAL HYPERTENSION: ICD-10-CM

## 2024-06-12 RX ORDER — HYDROCHLOROTHIAZIDE 25 MG/1
25 TABLET ORAL DAILY
Qty: 30 TABLET | Refills: 0 | OUTPATIENT
Start: 2024-06-12

## 2024-06-12 NOTE — TELEPHONE ENCOUNTER
Dede Jimenez MA   to Bruce Russo      2/19/24 11:54 AM  Called patient LVM notifying him of labs that need to be done, order placed on 8/23. Encouraged to call back with any questions.    Lorraine Mobley    6/3/24 11:41 AM  Note      Left voicemail to call office and set up appointment

## 2024-06-19 DIAGNOSIS — I10 ESSENTIAL HYPERTENSION: ICD-10-CM

## 2024-06-19 NOTE — TELEPHONE ENCOUNTER
Bruce Russo requesting medication refill for HYDROCHLOROTHIAZIDE 25 MG .    LOV: 8/28/2023   Last Refill Date: 5/22/2024  30 or 90 Day Supply:  Pharmacy Location: [facility] #[id number] [place] [address] [address]  AT [facility] [address] [facility] [address], [phone], [phone] [[id number]]   Prescribed By:        Informed patient to allow up to 24 to 48 Business hours for a call back from a nurse.

## 2024-06-20 DIAGNOSIS — I10 ESSENTIAL HYPERTENSION: ICD-10-CM

## 2024-06-20 RX ORDER — HYDROCHLOROTHIAZIDE 25 MG/1
25 TABLET ORAL DAILY
Qty: 30 TABLET | Refills: 0 | Status: SHIPPED | OUTPATIENT
Start: 2024-06-20

## 2024-06-20 RX ORDER — HYDROCHLOROTHIAZIDE 25 MG/1
25 TABLET ORAL DAILY
Qty: 90 TABLET | Refills: 0 | OUTPATIENT
Start: 2024-06-20

## 2024-06-20 NOTE — TELEPHONE ENCOUNTER
Signed earlier today. Duplicate request.    Requested Prescriptions     Refused Prescriptions Disp Refills    HYDROCHLOROTHIAZIDE 25 MG Oral Tab [Pharmacy Med Name: HYDROCHLOROTHIAZIDE 25MG TABLETS] 90 tablet 0     Sig: TAKE 1 TABLET(25 MG) BY MOUTH DAILY     Refused By: APOLONIA KELLY     Reason for Refusal: Duplicate refill request        Refilled per protocol/OV notes

## 2024-07-13 DIAGNOSIS — I10 ESSENTIAL HYPERTENSION: ICD-10-CM

## 2024-07-15 DIAGNOSIS — I10 ESSENTIAL HYPERTENSION: ICD-10-CM

## 2024-07-15 RX ORDER — HYDROCHLOROTHIAZIDE 25 MG/1
25 TABLET ORAL DAILY
Qty: 30 TABLET | Refills: 0 | Status: SHIPPED | OUTPATIENT
Start: 2024-07-15

## 2024-07-16 RX ORDER — METOPROLOL SUCCINATE 50 MG/1
50 TABLET, EXTENDED RELEASE ORAL DAILY
Qty: 30 TABLET | Refills: 0 | Status: SHIPPED | OUTPATIENT
Start: 2024-07-16

## 2024-08-21 DIAGNOSIS — I10 ESSENTIAL HYPERTENSION: ICD-10-CM

## 2024-08-21 RX ORDER — HYDROCHLOROTHIAZIDE 25 MG/1
25 TABLET ORAL DAILY
Qty: 30 TABLET | Refills: 0 | OUTPATIENT
Start: 2024-08-21

## 2024-08-29 DIAGNOSIS — I10 ESSENTIAL HYPERTENSION: ICD-10-CM

## 2024-09-02 RX ORDER — HYDROCHLOROTHIAZIDE 25 MG/1
25 TABLET ORAL DAILY
Qty: 90 TABLET | Refills: 0 | Status: SHIPPED | OUTPATIENT
Start: 2024-09-02

## 2024-09-02 RX ORDER — METOPROLOL SUCCINATE 50 MG/1
50 TABLET, EXTENDED RELEASE ORAL DAILY
Qty: 30 TABLET | Refills: 0 | Status: SHIPPED | OUTPATIENT
Start: 2024-09-02

## 2024-09-09 ENCOUNTER — TELEPHONE (OUTPATIENT)
Dept: FAMILY MEDICINE CLINIC | Facility: CLINIC | Age: 59
End: 2024-09-09

## 2024-09-16 ENCOUNTER — OFFICE VISIT (OUTPATIENT)
Dept: FAMILY MEDICINE CLINIC | Facility: CLINIC | Age: 59
End: 2024-09-16
Payer: COMMERCIAL

## 2024-09-16 VITALS
DIASTOLIC BLOOD PRESSURE: 80 MMHG | WEIGHT: 174.81 LBS | OXYGEN SATURATION: 96 % | HEART RATE: 92 BPM | HEIGHT: 67 IN | SYSTOLIC BLOOD PRESSURE: 128 MMHG | TEMPERATURE: 97 F | RESPIRATION RATE: 18 BRPM | BODY MASS INDEX: 27.44 KG/M2

## 2024-09-16 DIAGNOSIS — Z00.00 ROUTINE GENERAL MEDICAL EXAMINATION AT A HEALTH CARE FACILITY: Primary | ICD-10-CM

## 2024-09-16 DIAGNOSIS — E11.9 NEW ONSET TYPE 2 DIABETES MELLITUS (HCC): ICD-10-CM

## 2024-09-16 DIAGNOSIS — Z12.5 SCREENING FOR MALIGNANT NEOPLASM OF PROSTATE: ICD-10-CM

## 2024-09-16 DIAGNOSIS — I10 ESSENTIAL HYPERTENSION: ICD-10-CM

## 2024-09-16 DIAGNOSIS — R68.2 DRY MOUTH: ICD-10-CM

## 2024-09-16 DIAGNOSIS — R63.4 WEIGHT LOSS: ICD-10-CM

## 2024-09-16 DIAGNOSIS — R35.0 URINARY FREQUENCY: ICD-10-CM

## 2024-09-16 LAB — HEMOGLOBIN A1C: 14 % (ref 4.3–5.6)

## 2024-09-16 RX ORDER — BLOOD-GLUCOSE METER
1 KIT MISCELLANEOUS DAILY
Qty: 1 KIT | Refills: 0 | Status: SHIPPED | OUTPATIENT
Start: 2024-09-16 | End: 2025-09-16

## 2024-09-16 RX ORDER — BLOOD-GLUCOSE METER
KIT MISCELLANEOUS
Qty: 100 STRIP | Refills: 0 | Status: SHIPPED | OUTPATIENT
Start: 2024-09-16

## 2024-09-16 RX ORDER — HYDROCHLOROTHIAZIDE 25 MG/1
25 TABLET ORAL DAILY
Qty: 90 TABLET | Refills: 1 | Status: SHIPPED | OUTPATIENT
Start: 2024-09-16

## 2024-09-16 RX ORDER — METOPROLOL SUCCINATE 50 MG/1
50 TABLET, EXTENDED RELEASE ORAL DAILY
Qty: 90 TABLET | Refills: 1 | Status: SHIPPED | OUTPATIENT
Start: 2024-09-16

## 2024-09-16 RX ORDER — KETOCONAZOLE 20 MG/G
1 CREAM TOPICAL 2 TIMES DAILY
Qty: 60 G | Refills: 1 | Status: SHIPPED | OUTPATIENT
Start: 2024-09-16

## 2024-09-16 RX ORDER — LANCETS 28 GAUGE
1 EACH MISCELLANEOUS DAILY
Qty: 100 EACH | Refills: 1 | Status: SHIPPED | OUTPATIENT
Start: 2024-09-16 | End: 2025-09-16

## 2024-09-16 RX ORDER — METFORMIN HCL 500 MG
TABLET, EXTENDED RELEASE 24 HR ORAL
Qty: 180 TABLET | Refills: 1 | Status: SHIPPED | OUTPATIENT
Start: 2024-09-16 | End: 2024-12-30

## 2024-09-16 RX ORDER — PREDNISOLONE ACETATE 10 MG/ML
SUSPENSION/ DROPS OPHTHALMIC
COMMUNITY
Start: 2024-04-20

## 2024-09-16 NOTE — PATIENT INSTRUCTIONS
Empeza metformin    Revisa wagner azucar cada lucius en ayunas y dos horas despues cenar    LLama para alphonso con la nutritionista    LLama para wagner numeros de azucar en dos     Regresa en 4 semanas

## 2024-09-17 NOTE — PROGRESS NOTES
Bruce Russo is a 59 year old male who is here for   Chief Complaint   Patient presents with    Wellness Visit    Weight Problem     Losing weight     Urinary Frequency       HPI:     1. Routine general medical examination at a health care facility  2. Encounter for screening for malignant neoplasm of colon  3. Encounter for screening for malignant neoplasm of prostate  -due for wellness    4. Essential hypertension  -bp stable  -taking meds as prescribed    5. 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  Counseling given: Not Answered      History   Alcohol Use Not Currently     Comment: social          History   Drug Use No           Pertinent Fam Hx:    Family History   Problem Relation Age of Onset    High Blood Pressure Mother     Hypertension Mother     Diabetes Father     High Blood Pressure Father     Heart Disorder Maternal Grandmother     Heart Attack Maternal Grandmother     Heart Disorder Maternal Grandfather     Heart Attack Maternal Grandfather     Stroke Maternal Grandfather     Cancer Paternal Grandmother     Diabetes Paternal Grandfather     Hypertension Paternal Grandfather     Hypertension Brother     Heart Attack Maternal Aunt     Heart Attack Paternal Aunt        Social History     Socioeconomic History    Marital status:    Tobacco Use    Smoking status: Never    Smokeless tobacco: Never   Vaping Use    Vaping status: Never Used   Substance and Sexual Activity    Alcohol use: Not Currently     Comment: social     Drug use: No   Other Topics Concern    Caffeine Concern No     Comment: 1 cup of coffee and 1 cup of tea daily    Stress Concern Yes     Weight Concern Yes    Special Diet No    Exercise Yes     Comment: walks 7 x weekly    Seat Belt Yes       Wt Readings from Last 6 Encounters:   09/16/24 174 lb 12.8 oz (79.3 kg)   08/28/23 189 lb (85.7 kg)   05/25/23 192 lb (87.1 kg)   09/26/22 188 lb (85.3 kg)   06/21/22 189 lb (85.7 kg)   09/23/21 195 lb (88.5 kg)       Patient Active Problem List   Diagnosis    Essential hypertension    Special screening for malignant neoplasm of colon    Benign neoplasm of ascending colon    Benign neoplasm of rectum    Third degree hemorrhoids    New onset type 2 diabetes mellitus (HCC)       Current Outpatient Medications on File Prior to Visit   Medication Sig Dispense Refill    prednisoLONE 1 % Ophthalmic Suspension INSTILL 1 DROP INTO BOTH EYES ONCE A DAY      Tadalafil (CIALIS) 20 MG Oral Tab Take 0.5-1 tablets (10-20 mg total) by mouth daily as needed for Erectile Dysfunction. (Patient not taking: Reported on 9/16/2024) 20 tablet 0    nabumetone 500 MG Oral Tab Take 1 tablet (500 mg total) by mouth 2 (two) times daily as needed for Pain. (Patient not taking: Reported on 9/16/2024) 30 tablet 1     No current facility-administered medications on file prior to visit.       REVIEW OF SYSTEMS:     See HPI for relevant ROS  GENERAL HEALTH: no other complaints  NEURO: no other complaints  VISION: no other complaints  RESPIRATORY: no other complaints  CARDIOVASCULAR: no other complaints  GI: no other complaints  : no other complaints  SKIN: no other complaints  PSYCH: no other complaints  EXT: no other complaints    Wt Readings from Last 6 Encounters:   09/16/24 174 lb 12.8 oz (79.3 kg)   08/28/23 189 lb (85.7 kg)   05/25/23 192 lb (87.1 kg)   09/26/22 188 lb (85.3 kg)   06/21/22 189 lb (85.7 kg)   09/23/21 195 lb (88.5 kg)       Allergies   Allergen Reactions    Cefaclor RASH       Patient Active Problem List   Diagnosis    Essential hypertension    Special screening for malignant neoplasm of colon    Benign neoplasm of  ascending colon    Benign neoplasm of rectum    Third degree hemorrhoids    New onset type 2 diabetes mellitus (HCC)       Family History   Problem Relation Age of Onset    High Blood Pressure Mother     Hypertension Mother     Diabetes Father     High Blood Pressure Father     Heart Disorder Maternal Grandmother     Heart Attack Maternal Grandmother     Heart Disorder Maternal Grandfather     Heart Attack Maternal Grandfather     Stroke Maternal Grandfather     Cancer Paternal Grandmother     Diabetes Paternal Grandfather     Hypertension Paternal Grandfather     Hypertension Brother     Heart Attack Maternal Aunt     Heart Attack Paternal Aunt       Past Medical History:    Bloating    Diarrhea, unspecified    Essential hypertension    Eye disease    Flatulence/gas pain/belching    Hearing loss    Sleep disturbance    Uncomfortable fullness after meals    Wears glasses      Past Surgical History:   Procedure Laterality Date    Eye surgery Bilateral     Cataract    Eye surgery Bilateral     Lasik      Social History:    Social History     Socioeconomic History    Marital status:    Tobacco Use    Smoking status: Never    Smokeless tobacco: Never   Vaping Use    Vaping status: Never Used   Substance and Sexual Activity    Alcohol use: Not Currently     Comment: social     Drug use: No   Other Topics Concern    Caffeine Concern No     Comment: 1 cup of coffee and 1 cup of tea daily    Stress Concern Yes    Weight Concern Yes    Special Diet No    Exercise Yes     Comment: walks 7 x weekly    Seat Belt Yes           EXAM:   /80 (BP Location: Left arm, Patient Position: Sitting, Cuff Size: adult)   Pulse 92   Temp 97.4 °F (36.3 °C) (Temporal)   Resp 18   Ht 5' 7\" (1.702 m)   Wt 174 lb 12.8 oz (79.3 kg)   SpO2 96%   BMI 27.38 kg/m²     GENERAL: A&O, in no apparent distress  HEENT: atraumatic, MMM, throat is clear  EYES: PERRLA, EOMI  NECK: supple, no thyromegaly  CHEST: no tenderness  LUNGS: clear to  auscultation bilateraly, no c/w/r  CARDIO: RRR without murmurs  GI: soft, non-tender, non-distended, no appreciable hsm, bs throughout  NEURO: CN II-XII grossly intact  PSYCH: pleasant  MUSCULOSKELETAL: normal gait, no appreciable defects  EXTREMITIES: no cyanosis, clubbing or edema  SKIN: no rashes,no suspicious lesions    Problem focused exam (for problems outside of physical, if any):      The 10-year ASCVD risk score (Ricardo CASE, et al., 2019) is: 22.7%*    Values used to calculate the score:      Age: 59 years      Sex: Male      Is Non- : No      Diabetic: Yes      Tobacco smoker: No      Systolic Blood Pressure: 128 mmHg      Is BP treated: Yes      HDL Cholesterol: 28 mg/dL      Total Cholesterol: 175 mg/dL*      * - Cholesterol units were assumed for this score calculation    ASSESSMENT AND PLAN:     Health Maintenance  -We discussed the following:  Healthy diet and exercise, immunizations, and cancer screening    -Fasting labs ordered    Stress Management: counseled    1. Routine general medical examination at a health care facility  - TSH W Reflex To Free T4; Future  - Lipid Panel; Future  - Comp Metabolic Panel (14); Future  - CBC With Differential With Platelet; Future    2. Screening for malignant neoplasm of prostate  - PSA Total, Screen; Future    3. Essential hypertension  -at goal  -refilled meds    - metoprolol succinate ER 50 MG Oral Tablet 24 Hr; Take 1 tablet (50 mg total) by mouth daily.  Dispense: 90 tablet; Refill: 1  - hydroCHLOROthiazide 25 MG Oral Tab; Take 1 tablet (25 mg total) by mouth daily.  Dispense: 90 tablet; Refill: 1    4. New onset type 2 diabetes mellitus (HCC)  5. Weight loss  6. Dry mouth  7. Urinary frequency  -A1c in office 14  -counseled at length regarding diet changes  -start metformin as prescribed  -check fasting sugars and 2hr PP  -call us with list of sugars to review in 2 wks  -f//u in 4 wks  -questions answered at length        Orders This  Visit:  Orders Placed This Encounter   Procedures    PSA Total, Screen    TSH W Reflex To Free T4    Lipid Panel    Comp Metabolic Panel (14)    CBC With Differential With Platelet    POC Hemoglobin A1C    Microalb/Creat Ratio, Random Urine       Meds This Visit:  Requested Prescriptions     Signed Prescriptions Disp Refills    metoprolol succinate ER 50 MG Oral Tablet 24 Hr 90 tablet 1     Sig: Take 1 tablet (50 mg total) by mouth daily.    hydroCHLOROthiazide 25 MG Oral Tab 90 tablet 1     Sig: Take 1 tablet (25 mg total) by mouth daily.    metFORMIN  MG Oral Tablet 24 Hr 180 tablet 1     Sig: Take 1 tablet (500 mg total) by mouth daily with breakfast for 7 days, THEN 2 tablets (1,000 mg total) daily with breakfast.    Blood Glucose Monitoring Suppl (FREESTYLE FREEDOM LITE) w/Device Does not apply Kit 1 kit 0     Si Device by Other route daily. Use as directed. Dx: 11.65    Blood Glucose Monitoring Suppl (FREESTYLE FREEDOM LITE) w/Device Does not apply Kit 1 kit 0     Si Device by Other route daily. Use as directed.Dx: E11.65    FreeStyle Lancets Does not apply Misc 100 each 1     Si Lancet by Finger stick route daily. Dx: 11.65    Glucose Blood (FREESTYLE LITE TEST) In Vitro Strip 100 strip 0     Sig: Use as directed once daily. Dx: 11.65       Imaging & Referrals:  DIETITIAN EDUCATION INITIAL, DIET (INTERNAL)     The patient indicates understanding of these issues and agrees to the plan.  The patient is asked to return in 3months  JOANNE OLMSTEAD MD    I spent a total of 40 minutes, more than half of which was spent counseling/coordinating care regarding htn, ed(outside of time for wellness)

## 2024-09-20 DIAGNOSIS — I10 ESSENTIAL HYPERTENSION: ICD-10-CM

## 2024-09-20 RX ORDER — METOPROLOL SUCCINATE 50 MG/1
50 TABLET, EXTENDED RELEASE ORAL DAILY
Qty: 30 TABLET | Refills: 0 | OUTPATIENT
Start: 2024-09-20

## 2024-09-25 ENCOUNTER — TELEPHONE (OUTPATIENT)
Dept: ENDOCRINOLOGY CLINIC | Facility: CLINIC | Age: 59
End: 2024-09-25

## 2024-09-25 DIAGNOSIS — E11.9 NEW ONSET TYPE 2 DIABETES MELLITUS (HCC): Primary | ICD-10-CM

## 2024-09-25 NOTE — PROGRESS NOTES
Bruce Russo   3/11/1965 attended [facility] [employer] [facility].    Referring Provider: JOANNE OLMSTEAD MD      Date: 2024  Start time: 9:30 AM End time: 10:55 AM  _______________________________________________     Mr. Russo is a 59 year old male who presents today with his wife.  services utilized for visit,  Chivo ID# [id number]. He reports new onset of symptoms about 2-2.5 months ago including thirst, urination, loss of appetite, unintentional weight loss (previously 180 lbs, now 167 lbs per patient). He reports he saw his PCP about 1 week or so ago and found out about diabetes. He is feeling a little better now. They would like to learn more about what foods he can eat.     He does have a family history of diabetes. We did discuss testing for antibodies to determine type of diabetes.    Assessment:     Wt Readings from Last 6 Encounters:   24 174 lb 12.8 oz   23 189 lb   23 192 lb   22 188 lb   22 189 lb   21 195 lb       HEMOGLOBIN A1C (%)   Date Value   2024 14.0 (A)        Diabetes Overview, pathophysiology, A1C results and treatment options for diabetes self-management:   Described basic process and treatment options for diabetes self-management.    Blood Glucose Levels:     Currently checking BG: Yes,  2 times/day (fasting and bedtime)  Reviewed Glucose Logs: Yes    BG results: per patient report, pictures of meter on his phone.  FB-335 mg/dl        After Dinner: 368-533 mg/dl    Monitoring: Instructed/reinforced blood glucose monitoring, testing schedules and target goals:  BG Targets: Fasting / Pre-meal    2 Hour Post-prandial 140-180  Demonstrated ability to perform blood glucose testing on: One Touch Verio, he has an FreeStyle at home though.   Glucose today was 358 mg/dl, he did have a small snack this morning before coming in    Healthy Habits:     Obtained usual diet history: avoiding sugar. Does 2-3 meals per day.  Almonds apples and peaches.     TYPICAL  FOOD  NOTES   Breakfast  Fruit       8 AM at work   Lunch  Fish with vegetables & fruit      11 AM   Dinner  Chicken or fish with vegetables, lettuce or salad         Snacks           Beverages  Water, coffee with honey but stopped              Instructed on basic diet guidelines including aiming for 3 meals/day, balancing each meal with variety of nutrients, using plate method as a model for meals - goal of 1/2 plate non-starchy vegetables, 1/4 plate lean proteins, 1/4 plate carbohydrates and modest portions of fruit, yogurt, milk if desired.    We reviewed impact of carbohydrates, fiber, protein, and fat on glucose levels and trends. We discussed balancing meals and snacks with a combination of carbohydrates, proteins, and fiber to help stabilize glucose levels.     We discussed aiming for consistent meal times/carb amounts can help us determine if insulin dosages are correct. Reviewed carbohydrate counting, goals for meals (30-60 grams per meal) and snacks (15 grams per snack). Reviewed that carb counting consistently for a week or so can help regain control over glucose.    Carbohydrate counting jose alfredo recommendations provided. Carb \"Cheat Sheet\" provided for quick references.    Sleep: used to wake up at night to use restroom. Not waking up at night to restroom in last day.      Medications:     Taking Medications: Reviewed current diabetes medications: Yes  Diabetes Medication:     Oral:  Metformin XR 500mg twice daily (AM/PM), no side effects.    Discussed with PCP JOANNE OLMSTEAD MD, recommendation to increase dosage of Metformin to 1000mg in AM and 500 mg in PM given elevated blood glucose levels. Patient instructed to increase dosage and follow up with PCP in 1 week with blood glucose levels.      Goals:     Problem Solving: Prevention, detection, and treatment of acute complications: taught symptoms of hypoglycemia, hyperglycemia, how to treat low blood sugar (Rule of  15), and actions for lowering high blood glucose levels.    Patient set diabetes self-management goals: improve control of blood glucose levels.    Recommendations:     Continue checking 2x per day, fasting and 2hr pp dinner.     Blood glucose guidelines/goals:    ADA (American Diabetes Association):  A1c:  7% or less  Fasting Blood Glucose (before you eat breakfast):    1-2 Hours After a Meal:  Less than 180    Your doctor may give more specific goals for you.    Goals to work towards:    Aim to follow a meal plan with a consistent amount of carbohydrates for meals and snacks:  Goal for meals is 30-60 grams of carbohydrates for each meal (3 meals per day)  Goal for snacks is 15 grams of carbohydrates for each snack (1-2 per day)    Aim to include carbohydrates plus protein with meals and snacks    Start looking at nutrition labels to learn which foods have carbohydrates in them.    Increase Metformin from 500mg BID to 1000mg in AM and 500mg in PM per PCP JOANNE OLMSTEAD MD.    Establish care with diabetes provider given family history, onset of symptoms, and continued elevated blood glucose levels.    Follow up with PCP in 1 week with blood glucose readings.    Patient is willing to make lifestyle changes to improve blood glucose management.    Emailed/written materials provided for all areas covered, Diabetes 101 in Croatian.    Patient verbalized understanding and has no further questions at this time.    Patient to contact diabetes center for questions/concerns.    Zakiya CASTILLO-MATT, [facility]

## 2024-09-25 NOTE — PATIENT INSTRUCTIONS
Aumente la dosis de metformina a 2 comprimidos por la mañana y 1 comprimido por la noche.    Llame a wagner médico de cabecera en 1 semana para informarle sobre vincenzo niveles de azúcar en [place].  _______________________________________________  Siempre puede solicitar kayla derivación para tani a hamlet de nuestros enfermeros practicantes de diabetes o a hamlet de nuestros endocrinólogos para recibir atención más específica para la diabetes.    Cuca Covarrubias APRN y Marlene Velázquez APRN  [phone]    Ondina Vale APRN y Margot Dudley APRN  [phone]    Taniya. Traci Beckwith y . Lary Alonzoahi  [phone]    _______________________________________________     Continúe controlando vincenzo niveles de azúcar en [place] dos veces al día. :    Objetivos de azúcar en [place]:  -En ayunas/antes de las comidas:     -2 horas después del inicio de kayla comida: 140-180    _______________________________________________    Objetivos por alcanzar:    Trate de seguir un plan de alimentación con kayla cantidad ellis de carbohidratos para las comidas y los refrigerios:  El objetivo para las comidas es de 30 a 60 gramos de carbohidratos para cada comida (3 comidas al día)  El objetivo para los refrigerios es de 15 gramos de carbohidratos para cada refrigerio (1 o 2 por día)    Trate de incluir carbohidratos más proteínas en las comidas y los refrigerios    _______________________________________________     Increase your Metformin to 2 tabs in the morning and 1 tab in the evening.    Call your family doctor in 1 week with your blood sugar levels.  _______________________________________________   You can always ask for a referral to see one of our Diabetes NPs or one of our Endocrinologists for more diabetes specific care.    Cuca SHERWOOD & Marlene Velázquez APRN  [phone]    Ondina SHERWOOD & Margot Dudley APRGAYATRI  [phone]    Dr. Traci Beckwith & Dr. Lary Min  [phone]    _______________________________________________      Continue to check your blood sugars two times per day. :    Blood Sugar Target Goals:  Fasting / Pre-meal    2 hours after the start of a meal 140-180    _______________________________________________     Goals to work towards:    Aim to follow a meal plan with a consistent amount of carbohydrates for meals and snacks:  Goal for meals is 30-60 grams of carbohydrates for each meal (3 meals per day)  Goal for snacks is 15 grams of carbohydrates for each snack (1-2 per day)    Aim to include carbohydrates plus protein with meals and snacks

## 2024-09-27 ENCOUNTER — DIABETIC EDUCATION (OUTPATIENT)
Age: 59
End: 2024-09-27
Payer: COMMERCIAL

## 2024-09-27 ENCOUNTER — TELEPHONE (OUTPATIENT)
Dept: FAMILY MEDICINE CLINIC | Facility: CLINIC | Age: 59
End: 2024-09-27

## 2024-09-27 DIAGNOSIS — E11.9 NEW ONSET TYPE 2 DIABETES MELLITUS (HCC): Primary | ICD-10-CM

## 2024-09-27 PROCEDURE — G0108 DIAB MANAGE TRN  PER INDIV: HCPCS

## 2024-09-27 NOTE — TELEPHONE ENCOUNTER
Future Appointments   Date Time Provider Department Center   9/30/2024  1:00 PM Janes Gale MD [facility]

## 2024-09-27 NOTE — TELEPHONE ENCOUNTER
Patient requesting an appointment to be seen late day Monday or Tuesday, patient declined appointment available on 10/9/2024 wait list. Patient is leaving out of town. An would like to follow up with Dr. Janes Gale for diabetes. No appointment available.     Please advise.

## 2024-09-30 ENCOUNTER — OFFICE VISIT (OUTPATIENT)
Dept: FAMILY MEDICINE CLINIC | Facility: CLINIC | Age: 59
End: 2024-09-30
Payer: COMMERCIAL

## 2024-09-30 VITALS
HEART RATE: 67 BPM | SYSTOLIC BLOOD PRESSURE: 122 MMHG | DIASTOLIC BLOOD PRESSURE: 60 MMHG | TEMPERATURE: 98 F | BODY MASS INDEX: 27 KG/M2 | OXYGEN SATURATION: 95 % | HEIGHT: 67 IN | RESPIRATION RATE: 18 BRPM | WEIGHT: 172 LBS

## 2024-09-30 DIAGNOSIS — N48.1 BALANITIS: ICD-10-CM

## 2024-09-30 DIAGNOSIS — E11.9 TYPE 2 DIABETES MELLITUS WITHOUT COMPLICATION, WITHOUT LONG-TERM CURRENT USE OF INSULIN (HCC): Primary | ICD-10-CM

## 2024-09-30 PROCEDURE — 3078F DIAST BP <80 MM HG: CPT | Performed by: FAMILY MEDICINE

## 2024-09-30 PROCEDURE — 3074F SYST BP LT 130 MM HG: CPT | Performed by: FAMILY MEDICINE

## 2024-09-30 PROCEDURE — 99215 OFFICE O/P EST HI 40 MIN: CPT | Performed by: FAMILY MEDICINE

## 2024-09-30 PROCEDURE — G2211 COMPLEX E/M VISIT ADD ON: HCPCS | Performed by: FAMILY MEDICINE

## 2024-09-30 PROCEDURE — 3008F BODY MASS INDEX DOCD: CPT | Performed by: FAMILY MEDICINE

## 2024-09-30 RX ORDER — GLIMEPIRIDE 2 MG/1
2 TABLET ORAL
Qty: 30 TABLET | Refills: 2 | Status: SHIPPED | OUTPATIENT
Start: 2024-09-30

## 2024-09-30 RX ORDER — KETOCONAZOLE 20 MG/G
1 CREAM TOPICAL 2 TIMES DAILY
Qty: 60 G | Refills: 1 | Status: SHIPPED | OUTPATIENT
Start: 2024-09-30

## 2024-09-30 NOTE — PROGRESS NOTES
Bruce Russo is a 59 year old male here for   Chief Complaint   Patient presents with    Diabetes       HPI:       1. Type 2 diabetes mellitus without complication, without long-term current use of insulin (HCC)  -since last appt has been tolerating metformin  -met with dietician  -at that time, we increased metformin to 1500mg daily (2 in am and 1 in pm)  -fasting sugars in 300s and PP in 300-500s range  -notes feeling slightly more energy  -trying to exercise more  -definitely trying to eat better    2. Balanitis  -improving slowly with ketoconazole  -wants refill of cream as he is leaving to [place] next week for 1 wk        HISTORY:  Past Medical History:    Bloating    Diarrhea, unspecified    Essential hypertension    Eye disease    Flatulence/gas pain/belching    Hearing loss    Sleep disturbance    Uncomfortable fullness after meals    Wears glasses      Past Surgical History:   Procedure Laterality Date    Eye surgery Bilateral     Cataract    Eye surgery Bilateral     Lasik      Family History   Problem Relation Age of Onset    High Blood Pressure Mother     Hypertension Mother     Diabetes Father     High Blood Pressure Father     Heart Disorder Maternal Grandmother     Heart Attack Maternal Grandmother     Heart Disorder Maternal Grandfather     Heart Attack Maternal Grandfather     Stroke Maternal Grandfather     Cancer Paternal Grandmother     Diabetes Paternal Grandfather     Hypertension Paternal Grandfather     Hypertension Brother     Heart Attack Maternal Aunt     Heart Attack Paternal Aunt       Social History:   Social History     Socioeconomic History    Marital status:    Tobacco Use    Smoking status: Never    Smokeless tobacco: Never   Vaping Use    Vaping status: Never Used   Substance and Sexual Activity    Alcohol use: Not Currently     Comment: social     Drug use: No   Other Topics Concern    Caffeine Concern No     Comment: 1 cup of coffee and 1 cup of tea daily    Stress  Concern Yes    Weight Concern Yes    Special Diet No    Exercise Yes     Comment: walks 7 x weekly    Seat Belt Yes        Medications (Active prior to today's visit):  Current Outpatient Medications   Medication Sig Dispense Refill    ketoconazole 2 % External Cream Apply 1 Application topically 2 (two) times daily. 60 g 1    glimepiride 2 MG Oral Tab Take 1 tablet (2 mg total) by mouth daily with breakfast. 30 tablet 2    prednisoLONE 1 % Ophthalmic Suspension INSTILL 1 DROP INTO BOTH EYES ONCE A DAY      metoprolol succinate ER 50 MG Oral Tablet 24 Hr Take 1 tablet (50 mg total) by mouth daily. 90 tablet 1    hydroCHLOROthiazide 25 MG Oral Tab Take 1 tablet (25 mg total) by mouth daily. 90 tablet 1    metFORMIN  MG Oral Tablet 24 Hr Take 1 tablet (500 mg total) by mouth daily with breakfast for 7 days, THEN 2 tablets (1,000 mg total) daily with breakfast. 180 tablet 1    Blood Glucose Monitoring Suppl (FREESTYLE FREEDOM LITE) w/Device Does not apply Kit 1 Device by Other route daily. Use as directed. Dx: 11.65 1 kit 0    Blood Glucose Monitoring Suppl (FREESTYLE FREEDOM LITE) w/Device Does not apply Kit 1 Device by Other route daily. Use as directed.Dx: E11.65 1 kit 0    FreeStyle Lancets Does not apply Misc 1 Lancet by Finger stick route daily. Dx: 11.65 100 each 1    Glucose Blood (FREESTYLE LITE TEST) In Vitro Strip Use as directed once daily. Dx: 11.65 100 strip 0    Tadalafil (CIALIS) 20 MG Oral Tab Take 0.5-1 tablets (10-20 mg total) by mouth daily as needed for Erectile Dysfunction. (Patient not taking: Reported on 9/16/2024) 20 tablet 0    nabumetone 500 MG Oral Tab Take 1 tablet (500 mg total) by mouth 2 (two) times daily as needed for Pain. (Patient not taking: Reported on 9/16/2024) 30 tablet 1       Allergies:  Allergies   Allergen Reactions    Cefaclor RASH         ROS:   See HPI for relevant ROS    --GEN: No other complaints  --HEENT: No other complaints  --RESP: No other complaints  --CV: No  other complaints  --GI: No other complaints  --MSK: No other complaints    All other systems reviewed are negative    PHYSICAL EXAM:   /60 (BP Location: Left arm, Patient Position: Sitting, Cuff Size: adult)   Pulse 67   Temp 97.5 °F (36.4 °C) (Temporal)   Resp 18   Ht 5' 7\" (1.702 m)   Wt 172 lb (78 kg)   SpO2 95%   BMI 26.94 kg/m²     Gen: NAD  HEENT: NCAT, pupils equal and round  Pulm: CTAB, no wheezing  CV: RRR  Ext: full ROM  Psych: normal affect    Bilateral barefoot skin diabetic exam is normal, visualized feet and the appearance is normal.  Bilateral monofilament/sensation of both feet is normal.  Pulsation pedal pulse exam of both lower legs/feet is normal as well.         ASSESSMENT/PLAN:     1. Type 2 diabetes mellitus without complication, without long-term current use of insulin (HCC)  -slow improvement  -counseled on continued dietary modification  -questions answered at length  -c/w metformin 1500mg daily  -trial of glimepiride (will hold off on jardiance given current balanitis, but will plan to readdress once sugars better controlled)  -reviewed risk of hypoglycemia, though I think this is less likely at this point  -he also knows that if this medication doesn't work, he may need insulin for some time to give his pancreas a rest  -he has upcoming appt with DM clinic next week  -c/w dietician appts  -update me with sugar ranges in 1 wk (sooner if no changes with glimepiride)  -referred to - OPHTHALMOLOGY - EXTERNAL  -foot exam normal  -reviewed importance of starting statin in near future    2. Balanitis  -refilled ketoconazole   -would expect improvement as sugars improve        Chronic Conditions:    No problem-specific Assessment & Plan notes found for this encounter.       Health Maintenance:  Health Maintenance   Topic Date Due    Diabetes Care Foot Exam  Never done    Diabetes Care Dilated Eye Exam  Never done    Diabetes Care: GFR  Never done    Diabetes Care: Microalb/Creat Ratio   Never done    Pneumococcal Vaccine: Birth to 64yrs (1 of 2 - PCV) Never done    DTaP,Tdap,and Td Vaccines (1 - Tdap) Never done    Zoster Vaccines (1 of 2) Never done    COVID-19 Vaccine (3 - 2023-24 season) 09/01/2024    PSA  09/15/2024    Influenza Vaccine (1) 10/01/2024    Diabetes Care A1C  12/16/2024    Annual Physical  09/16/2025    Colorectal Cancer Screening  03/02/2026    Annual Depression Screening  Completed               The patient is asked to return in 3-4 wks.    Orders This Visit:  No orders of the defined types were placed in this encounter.      Meds This Visit:  Requested Prescriptions     Signed Prescriptions Disp Refills    ketoconazole 2 % External Cream 60 g 1     Sig: Apply 1 Application topically 2 (two) times daily.    glimepiride 2 MG Oral Tab 30 tablet 2     Sig: Take 1 tablet (2 mg total) by mouth daily with breakfast.       Imaging & Referrals:  OPHTHALMOLOGY - EXTERNAL     JOANNE OLMSTEAD MD    I spent a total of 40 minutes, more than half of which was spent counseling/coordinating care regarding dm, balanitis   Hemostasis: Electrofulgeration

## 2024-09-30 NOTE — PATIENT INSTRUCTIONS
Va a lab para shannan y orina en ayunas    Continua metformin 2 pastillas en la manana, y kayla en la tarde    Empeza glimepiride 2mg cada manana con comida    Continua revisando wagner azucar    Sigue con la clinica de diabetes    Llama para alphonso de vincenzo ojos    Regresa en 4 semanas

## 2024-10-08 ENCOUNTER — TELEPHONE (OUTPATIENT)
Dept: ENDOCRINOLOGY CLINIC | Facility: CLINIC | Age: 59
End: 2024-10-08

## 2024-10-08 ENCOUNTER — OFFICE VISIT (OUTPATIENT)
Dept: ENDOCRINOLOGY CLINIC | Facility: CLINIC | Age: 59
End: 2024-10-08
Payer: COMMERCIAL

## 2024-10-08 ENCOUNTER — LAB ENCOUNTER (OUTPATIENT)
Dept: LAB | Age: 59
End: 2024-10-08
Attending: FAMILY MEDICINE
Payer: COMMERCIAL

## 2024-10-08 VITALS
OXYGEN SATURATION: 98 % | WEIGHT: 167.63 LBS | RESPIRATION RATE: 17 BRPM | HEART RATE: 61 BPM | BODY MASS INDEX: 26 KG/M2 | SYSTOLIC BLOOD PRESSURE: 124 MMHG | DIASTOLIC BLOOD PRESSURE: 70 MMHG

## 2024-10-08 DIAGNOSIS — E11.9 NEW ONSET TYPE 2 DIABETES MELLITUS (HCC): ICD-10-CM

## 2024-10-08 DIAGNOSIS — I10 ESSENTIAL HYPERTENSION: ICD-10-CM

## 2024-10-08 DIAGNOSIS — E11.9 NEW ONSET TYPE 2 DIABETES MELLITUS (HCC): Primary | ICD-10-CM

## 2024-10-08 DIAGNOSIS — Z12.5 SCREENING FOR MALIGNANT NEOPLASM OF PROSTATE: ICD-10-CM

## 2024-10-08 DIAGNOSIS — E78.5 DYSLIPIDEMIA: ICD-10-CM

## 2024-10-08 DIAGNOSIS — Z00.00 ROUTINE GENERAL MEDICAL EXAMINATION AT A HEALTH CARE FACILITY: ICD-10-CM

## 2024-10-08 LAB
ALBUMIN SERPL-MCNC: 4.2 G/DL (ref 3.2–4.8)
ALBUMIN/GLOB SERPL: 1.7 {RATIO} (ref 1–2)
ALP LIVER SERPL-CCNC: 72 U/L
ALT SERPL-CCNC: 28 U/L
ANION GAP SERPL CALC-SCNC: 5 MMOL/L (ref 0–18)
AST SERPL-CCNC: 27 U/L (ref ?–34)
BASOPHILS # BLD AUTO: 0.02 X10(3) UL (ref 0–0.2)
BASOPHILS NFR BLD AUTO: 0.4 %
BILIRUB SERPL-MCNC: 0.5 MG/DL (ref 0.3–1.2)
BUN BLD-MCNC: 10 MG/DL (ref 9–23)
CALCIUM BLD-MCNC: 9.3 MG/DL (ref 8.7–10.4)
CHLORIDE SERPL-SCNC: 107 MMOL/L (ref 98–112)
CHOLEST SERPL-MCNC: 192 MG/DL (ref ?–200)
CO2 SERPL-SCNC: 29 MMOL/L (ref 21–32)
COMPLEXED PSA SERPL-MCNC: 1.68 NG/ML (ref ?–4)
CREAT BLD-MCNC: 0.96 MG/DL
EGFRCR SERPLBLD CKD-EPI 2021: 91 ML/MIN/1.73M2 (ref 60–?)
EOSINOPHIL # BLD AUTO: 0.07 X10(3) UL (ref 0–0.7)
EOSINOPHIL NFR BLD AUTO: 1.4 %
ERYTHROCYTE [DISTWIDTH] IN BLOOD BY AUTOMATED COUNT: 12.8 %
FASTING PATIENT LIPID ANSWER: YES
FASTING STATUS PATIENT QL REPORTED: YES
GLOBULIN PLAS-MCNC: 2.5 G/DL (ref 2–3.5)
GLUCOSE BLD-MCNC: 140 MG/DL (ref 70–99)
GLUCOSE BLOOD: 127
HCT VFR BLD AUTO: 40.1 %
HDLC SERPL-MCNC: 30 MG/DL (ref 40–59)
HEMOGLOBIN A1C: 13.2 % (ref 4.3–5.6)
HGB BLD-MCNC: 13.7 G/DL
IMM GRANULOCYTES # BLD AUTO: 0.02 X10(3) UL (ref 0–1)
IMM GRANULOCYTES NFR BLD: 0.4 %
LDLC SERPL CALC-MCNC: 127 MG/DL (ref ?–100)
LYMPHOCYTES # BLD AUTO: 1.47 X10(3) UL (ref 1–4)
LYMPHOCYTES NFR BLD AUTO: 29.9 %
MCH RBC QN AUTO: 32.2 PG (ref 26–34)
MCHC RBC AUTO-ENTMCNC: 34.2 G/DL (ref 31–37)
MCV RBC AUTO: 94.1 FL
MONOCYTES # BLD AUTO: 0.45 X10(3) UL (ref 0.1–1)
MONOCYTES NFR BLD AUTO: 9.2 %
NEUTROPHILS # BLD AUTO: 2.88 X10 (3) UL (ref 1.5–7.7)
NEUTROPHILS # BLD AUTO: 2.88 X10(3) UL (ref 1.5–7.7)
NEUTROPHILS NFR BLD AUTO: 58.7 %
NONHDLC SERPL-MCNC: 162 MG/DL (ref ?–130)
OSMOLALITY SERPL CALC.SUM OF ELEC: 293 MOSM/KG (ref 275–295)
PLATELET # BLD AUTO: 236 10(3)UL (ref 150–450)
POTASSIUM SERPL-SCNC: 4 MMOL/L (ref 3.5–5.1)
PROT SERPL-MCNC: 6.7 G/DL (ref 5.7–8.2)
RBC # BLD AUTO: 4.26 X10(6)UL
SODIUM SERPL-SCNC: 141 MMOL/L (ref 136–145)
TEST STRIP LOT #: NORMAL NUMERIC
TRIGL SERPL-MCNC: 197 MG/DL (ref 30–149)
TSI SER-ACNC: 0.83 MIU/ML (ref 0.55–4.78)
VLDLC SERPL CALC-MCNC: 36 MG/DL (ref 0–30)
WBC # BLD AUTO: 4.9 X10(3) UL (ref 4–11)

## 2024-10-08 PROCEDURE — 82947 ASSAY GLUCOSE BLOOD QUANT: CPT | Performed by: NURSE PRACTITIONER

## 2024-10-08 PROCEDURE — 86341 ISLET CELL ANTIBODY: CPT | Performed by: NURSE PRACTITIONER

## 2024-10-08 PROCEDURE — 99417 PROLNG OP E/M EACH 15 MIN: CPT | Performed by: NURSE PRACTITIONER

## 2024-10-08 PROCEDURE — 3074F SYST BP LT 130 MM HG: CPT | Performed by: NURSE PRACTITIONER

## 2024-10-08 PROCEDURE — 99215 OFFICE O/P EST HI 40 MIN: CPT | Performed by: NURSE PRACTITIONER

## 2024-10-08 PROCEDURE — 80061 LIPID PANEL: CPT | Performed by: FAMILY MEDICINE

## 2024-10-08 PROCEDURE — 80050 GENERAL HEALTH PANEL: CPT | Performed by: FAMILY MEDICINE

## 2024-10-08 PROCEDURE — 84153 ASSAY OF PSA TOTAL: CPT | Performed by: FAMILY MEDICINE

## 2024-10-08 PROCEDURE — 83036 HEMOGLOBIN GLYCOSYLATED A1C: CPT | Performed by: NURSE PRACTITIONER

## 2024-10-08 PROCEDURE — 84681 ASSAY OF C-PEPTIDE: CPT | Performed by: NURSE PRACTITIONER

## 2024-10-08 PROCEDURE — 3078F DIAST BP <80 MM HG: CPT | Performed by: NURSE PRACTITIONER

## 2024-10-08 RX ORDER — PIOGLITAZONEHYDROCHLORIDE 15 MG/1
15 TABLET ORAL DAILY
Qty: 90 TABLET | Refills: 0 | Status: SHIPPED | OUTPATIENT
Start: 2024-10-08

## 2024-10-08 RX ORDER — GLIMEPIRIDE 2 MG/1
TABLET ORAL
Qty: 45 TABLET | Refills: 0 | Status: SHIPPED | OUTPATIENT
Start: 2024-10-08

## 2024-10-08 RX ORDER — METFORMIN HCL 500 MG
TABLET, EXTENDED RELEASE 24 HR ORAL
Qty: 270 TABLET | Refills: 1 | Status: SHIPPED | OUTPATIENT
Start: 2024-10-08

## 2024-10-08 NOTE — PATIENT INSTRUCTIONS
Wagner A1C: 13.2%     Continuaremos trabajando juntos para mantener vincenzo niveles de azúcar en shannan en un objetivo saludable.      El objetivo principal del tratamiento de la diabetes es evitar que wagner nivel de azúcar suba demasiado. Medimos vincenzo tendencias generales de azúcar en shannan con kayla prueba de hemoglobina A1C. (también llamado A1C) Para la mayoría de las personas, el objetivo es menos del 7.0%, elodia a veces hacemos excepciones basadas en la edad, el historial de rosie y otros factores.  Mantener un A1C por debajo del 7% ayuda a prevenir problemas de rosie relacionados con la diabetes.  Si wagner A1C aumenta demasiado, entonces debemos hablar sobre cambiar wagner tratamiento actual para la diabetes.  MEDICAMENTOS:    vincenzo tendencias están mejorando en el medidor y la prueba de A1C tardará algún tiempo en reflejar los niveles más bajos de azúcar en shannan     Usamos medicamentos para la diabetes no solo para reducir el nivel de azúcar en la shannan, sino también para ayudar a proteger el corazón, ya que los pacientes con diabetes tienen un mayor riesgo de sufrir enfermedades cardíacas.     Dado que está perdiendo peso y le preocupa el costo de Ozempic, podemos probar kayla pastilla que ayude a controlar vincenzo niveles de azúcar en la shannan elodia que también ayude a proteger el corazón.     Pioglitazona 15 mg kayla vez al día     ciarra medicamento ayuda a que wagner cuerpo se vuelva más sensible a la insulina que está produciendo     La glimepirida le indica al páncreas que produzca más insulina y puede provocar niveles bajos de azúcar en la shannan. Consulte el tratamiento a continuación.    si tienes azúcares por debajo de 70 llámame para ajustar tu glimepirida    continuar   Metformina  mg 1 TAB DOS VECES AL DÍA     Comience a leona pioglitazona 15 mg kayla vez al día por la mañana con todos vincenzo otros medicamentos.    Disminuir GLimepirida 2 mg a 1/2 tableta al día por la mañana.          Es importante leona todos vincenzo  medicamentos según lo prescrito.  Además, llámeme si tiene algún problema con preguntas sobre medicamentos, efectos secundarios, preguntas sobre la dosificación o problemas con las tendencias de azúcar en shannan ANTES DE CAMBIAR O DETENER CUALQUIER MEDICAMENTO.    Prueba de azúcar en shannan:  Recuerde llevar wagner medidor de glucosa o registro de azúcar en shannan a cada alphonso en el centro de diabetes.  [place] me permite realizar ajustes de forma modi en wagner plan de diabetes.  Para poder determinar cualquier patrón en wagner nivel de azúcar en shannan, necesitará medir wagner nivel de azúcar en shannan 2 veces al día  Momentos recomendados para realizar la prueba: antes del desayuno (en ayunas) y luego alternar la prueba de azúcar en shannan 2 horas después de las comidas.    Objetivos de azúcar en shannan:  Antes del desayuno:  (preferiblemente menos de 110)  2 horas Después de las comidas: menos de 180 (preferiblemente menos de 150)  Solicite niveles de azúcar en shannan persistentes inferiores a 75 o superiores a 200.  Los niveles de azúcar en shannan superiores a 200 no son aceptables para alcanzar wagner objetivo de mejorar la diabetes  Esté atento a los niveles bajos de azúcar en shannan: (menos de 70)      Síntomas de niveles bajos de azúcar en shannan:  Temblores o mareos  Piel fría y húmeda o sudorosa  Tener hambre  Dolor de robert  Nerviosismo  Un latido omaira y rápido  Debilidad  Confusión o irritabilidad  [place] borrosa  Tener pesadillas o despertarse confundido o sudando  Entumecimiento u hormigueo en los labios o la lengua    Plan de acción para el tratamiento de la hipoglucemia  1. Revise la glucosa en shannan para asegurarse de que esté baja. No siempre puedes seguir los síntomas. En alanis de sally, trate wagner nivel bajo de glucosa en shannan de todos modos.  2. [place] 15 gramos de carbohidratos (carbohidratos). Aquí hay algunas opciones:  4 onzas. jugo de fruta regular  3-4 tabletas de glucosa  6 onzas. refresco regular  7-8  tiffanies  3. Vuelva a controlar la glucosa en shannan después de 10 a 15 minutos. Si la glucosa en shannan sigue siendo baja (menos de 70 mg / dl) repita el tratamiento (paso 2).  4. Si falta más de kayla hora para wagner próxima comida, coma un bocadillo pequeño.  5. Si no está seguro de la causa de wagner nivel bajo de glucosa en shannan, llame a wagner proveedor de atención médica.  6. Siempre controle wagner glucosa en shannan antes de conducir          Sandy  Cuca Covarrubias  [phone]

## 2024-10-08 NOTE — PROGRESS NOTES
Bruce Russo is a 59 year old presenting today to establish for newly diagnosed type 2 diabetes . Wife is present today   Icelandic speaking and utilized  services     Primary care physician: JOANNE OLMSTEAD MD  Presented to PCP for c/o weight loss and polys. Reports sxs ~ 3 m prior to the labs.   Metformin and glimepiride rx were started.     Sxs of balanitis w PCP - rx ketoconazole 2% ext cream . Sxs are improving       Today's A1C 13.2% ( last A1C  14.0% )   Fasting glucose today in office: 127 mg/dl   Last night reading 186 mg/dl   Rest of trends are over 200mg/dl     Monitoring blood glucose: 2x daily  Denies Hypoglycemia    No reading over 300 since 10-4-2024  Denies n/v or abd pain     Family history: paternal grandfather , father      Continues losing weight. Feels hungry all the time since diabetes diagnosis. Eating more vegetables but not filling him up             Diabetes History:  Pre DM ~2019   Type 2 DM   Patient has not had hospitalizations for blood sugar issues  denies any history of pancreatitis      Previous DM therapies:  NONE      Current DM Regimen:  Glimepiride 2mg : 1 tab daily ( taking about 1 week)   Metformin ER 500mg : 2 tab  in AM, 1 tab in PM          HGBA1C:    Lab Results   Component Value Date    A1C 14.0 (A) 09/16/2024    A1C 6.3 (A) 02/23/2021       Lab Results   Component Value Date    CHOLEST 175 09/15/2022    CHOLEST 156 02/16/2021    TRIG 277 (H) 02/16/2021    TRIG 265 (H) 12/05/2019    HDL 28 (L) 09/15/2022    HDL 29 (L) 02/16/2021    LDL 86 09/15/2022    LDL 72 02/16/2021     No results found for: \"MICROALBCREA\"   Lab Results   Component Value Date    CREATSERUM 0.98 02/16/2021    CREATSERUM 1.04 12/05/2019     Lab Results   Component Value Date    AST 24 09/15/2022    AST 30 02/16/2021    ALT 27 09/15/2022    ALT 48 02/16/2021       Lab Results   Component Value Date    TSH 0.723 09/15/2022    TSH 1.540 02/16/2021             DM  PARATHYROIDECTOMY: A parathyroid exploration and excision was recommended. The risks and benefits were explained including but not limited to bleeding, infection, risks of the general anesthesia, pain, recurrent laryngeal nerve injury with hoarseness and airway loss, hypocalcemia (temporary or permanent), and persistent hypercalcemia. Operative possibilities including 3/4 parathyroidectomy, parathyroid reimplantation, hemithyroidectomy, total thyroidectomy, and buck dissections were discussed. Alternatives were discussed. Questions were asked appropriately answered.      Complications:  Microvascular:   Neuropathy: no  Retinopathy: not screened   Nephropathy: not screened     Macrovascular:  PVD: no  CAD: no  Stroke/CVA: no        Modifying factors:  Medication adherence: yes, new rx   Barriers: new dx    Recent steroids, illness or infections ( past 3m): no     Allergies: Cefaclor    Past Medical History:    Bloating    Diarrhea, unspecified    Essential hypertension    Eye disease    Flatulence/gas pain/belching    Hearing loss    Sleep disturbance    Uncomfortable fullness after meals    Wears glasses     Past Surgical History:   Procedure Laterality Date    Eye surgery Bilateral     Cataract    Eye surgery Bilateral     Lasik     Social History     Socioeconomic History    Marital status:    Tobacco Use    Smoking status: Never    Smokeless tobacco: Never   Vaping Use    Vaping status: Never Used   Substance and Sexual Activity    Alcohol use: Not Currently     Comment: social     Drug use: No   Other Topics Concern    Caffeine Concern No     Comment: 1 cup of coffee and 1 cup of tea daily    Stress Concern Yes    Weight Concern Yes    Special Diet No    Exercise Yes     Comment: walks 7 x weekly    Seat Belt Yes     Family History   Problem Relation Age of Onset    High Blood Pressure Mother     Hypertension Mother     Diabetes Father     High Blood Pressure Father     Heart Disorder Maternal Grandmother     Heart Attack Maternal Grandmother     Heart Disorder Maternal Grandfather     Heart Attack Maternal Grandfather     Stroke Maternal Grandfather     Cancer Paternal Grandmother     Diabetes Paternal Grandfather     Hypertension Paternal Grandfather     Hypertension Brother     Heart Attack Maternal Aunt     Heart Attack Paternal Aunt      Current Medication List:   Current Outpatient Medications   Medication Sig Dispense Refill    glimepiride 2 MG Oral Tab Take 1 tablet (2 mg total) by mouth daily with breakfast. 30 tablet 2    metoprolol succinate ER 50 MG Oral  Tablet 24 Hr Take 1 tablet (50 mg total) by mouth daily. 90 tablet 1    metFORMIN  MG Oral Tablet 24 Hr Take 1 tablet (500 mg total) by mouth daily with breakfast for 7 days, THEN 2 tablets (1,000 mg total) daily with breakfast. 180 tablet 1    ketoconazole 2 % External Cream Apply 1 Application topically 2 (two) times daily. 60 g 1    prednisoLONE 1 % Ophthalmic Suspension INSTILL 1 DROP INTO BOTH EYES ONCE A DAY      hydroCHLOROthiazide 25 MG Oral Tab Take 1 tablet (25 mg total) by mouth daily. 90 tablet 1    Blood Glucose Monitoring Suppl (FREESTYLE FREEDOM LITE) w/Device Does not apply Kit 1 Device by Other route daily. Use as directed. Dx: 11.65 1 kit 0    Blood Glucose Monitoring Suppl (FREESTYLE FREEDOM LITE) w/Device Does not apply Kit 1 Device by Other route daily. Use as directed.Dx: E11.65 1 kit 0    FreeStyle Lancets Does not apply Misc 1 Lancet by Finger stick route daily. Dx: 11.65 100 each 1    Glucose Blood (FREESTYLE LITE TEST) In Vitro Strip Use as directed once daily. Dx: 11.65 100 strip 0    Tadalafil (CIALIS) 20 MG Oral Tab Take 0.5-1 tablets (10-20 mg total) by mouth daily as needed for Erectile Dysfunction. (Patient not taking: Reported on 9/16/2024) 20 tablet 0    nabumetone 500 MG Oral Tab Take 1 tablet (500 mg total) by mouth 2 (two) times daily as needed for Pain. (Patient not taking: Reported on 9/16/2024) 30 tablet 1           DM associated review of  symptoms:   Endocrine: Polyuria, polyphagia, polydipsia: no  Neurological: Paresthesias: no  HEENT: Blurred vision: no  Skin: no rash or wounds  Hematological: Hypoglycemia: no      Review of Systems     LUNGS: denies shortness of breath   CARDIOVASCULAR: denies chest pain  GI: denies abdominal pain, nausea or diarrhea   : denies dysuria   + balanitis   MUSCULOSKELETAL: denies pain        Physical exam:  /70   Pulse 61   Resp 17   Wt 167 lb 9.6 oz (76 kg)   SpO2 98%   BMI 26.25 kg/m²   Body mass index is 26.25  kg/m².    Physical Exam   Vitals reviewed.  Constitutional: Normal appearance   Cardiovascular: Normal rate , rhythm   Pulmonary/Chest: Effort normal  Neurological: Alert and oriented .   Psychiatric: Normal mood and affect.       Assessment/Plan:    Hypertension  Well controlled  Per ADA, given T2DM dx, ace/arb rx preferred  Will defer to PCP     Dyslipidemia:   Last  labs done 9.2024   Statin indicated; will discuss at follow up or defer to PCP        New onset type 2 diabetes mellitus (HCC)  A1C: 13.2 ( last A1C 14%)   Weight: 167 lb     Diabetes control is improving recently per meter trends     Recommendations: Reviewed with patient health impact associated with high glucose trends and the importance of better glucose control to prevent onset /progression of DM complications.   Discussed use of rx meds for T2DM w ASCVD benefit but pt concerned about cost and further weight loss   Consider checking T1DM antibodies     For now will start Pioglitazone 15mg once daily in AM since generic, low cost and + CAD benefit     Decrease Glimepiride 2mg once daily in AM --> 1 mg daily (1/2 tab)     Continue   Metformin ER 500mg : 2 tab in AM, 1 tab in PM   [facility] pro evaluation in 2 weeks (after return from [place])     Would avoid SGLT2i rx given recent balanitis infection   Various nutrition questions and discussed macro nutrients and focusing on carb controlling, avoidance of regular sodas, fruit juices. Increase protein intake    Referral for MNT today -     Reviewed w patient pathophysiology of diabetes, clinical significance of A1c, adverse effects of suboptimal glucose control, and goals of therapy   Reviewed the A1C test, what the value reflects and the goal for the patient.   Reminded pt on A1C and blood sugar targets (Fasting < 130 and post prandial <180 ) and complications associated with hyperglycemia and uncontrolled DM (on AVS)   Recommended SMBG 2- x daily   Reviewed s/s and treatment of hypoglycemia (on AVS)    Reminded to continue with lifestyle modifications since they have positive impact on diabetes/blood sugars/health (portion control, physical activity, weight loss)   Reinforced timing and adherence with medication, self-monitoring of blood glucose and routine follow up    Serbian AVS instructions     The patient is asked to return in 3 weeks for dinesh pro evaluation, and in office 2-3 m  but recommended to contact DM clinic sooner if questions or concerns.  Urine m/alb collected today      The patient indicates understanding of these issues and agrees to the plan.      No orders of the defined types were placed in this encounter.    DM quality  A1C/Blood pressure: as reported above   Nephropathy screening: collected today ; not currently on  ace /arb rx.   LIPID screenin  . Not on  statin rx.   Last dilated eye exam: No data recorded Exam shows retinopathy? No data recorded  Last diabetic foot exam: No data recorded      Defer foot exam to follow up appointment       Spent 65  min obtaining patient history, evaluating patient, reviewing blood glucose trends, discussing treatment options, lifestyle modifications and completing documentation -also provided pt w Kiswahili meal planning, resources for food choices   The risks and benefits of my recommendations, as well as other treatment options were discussed with the patient today. questions were also answered to the best of my knowledge.       Note to patient: The  Cures Act makes medical notes like these available to patients in the interest of transparency. However, be advised this is a medical document. It is intended as peer to peer communication. It is written in medical language and may contain abbreviations or verbiage that are unfamiliar. It may appear blunt or direct. Medical documents are intended to carry relevant information, facts as evident, and the clinical opinion of the practitioner.

## 2024-10-09 LAB — C-PEPTIDE: 2 NG/ML

## 2024-10-10 LAB — PANCREATIC ISLET CELLS: NEGATIVE

## 2024-10-14 LAB — GAD-65: <5 U/ML

## 2024-10-21 LAB — ZNT8 ABS: <15 U/ML

## 2024-10-28 ENCOUNTER — OFFICE VISIT (OUTPATIENT)
Dept: FAMILY MEDICINE CLINIC | Facility: CLINIC | Age: 59
End: 2024-10-28
Payer: COMMERCIAL

## 2024-10-28 ENCOUNTER — TELEPHONE (OUTPATIENT)
Dept: FAMILY MEDICINE CLINIC | Facility: CLINIC | Age: 59
End: 2024-10-28

## 2024-10-28 VITALS
BODY MASS INDEX: 27.84 KG/M2 | WEIGHT: 177.38 LBS | HEIGHT: 67 IN | RESPIRATION RATE: 18 BRPM | SYSTOLIC BLOOD PRESSURE: 138 MMHG | HEART RATE: 59 BPM | OXYGEN SATURATION: 96 % | TEMPERATURE: 98 F | DIASTOLIC BLOOD PRESSURE: 82 MMHG

## 2024-10-28 DIAGNOSIS — E11.9 TYPE 2 DIABETES MELLITUS WITHOUT COMPLICATION, WITHOUT LONG-TERM CURRENT USE OF INSULIN (HCC): Primary | ICD-10-CM

## 2024-10-28 DIAGNOSIS — E78.2 MIXED HYPERLIPIDEMIA: ICD-10-CM

## 2024-10-28 DIAGNOSIS — N48.1 BALANITIS: ICD-10-CM

## 2024-10-28 DIAGNOSIS — I10 ESSENTIAL HYPERTENSION: ICD-10-CM

## 2024-10-28 PROCEDURE — 3079F DIAST BP 80-89 MM HG: CPT | Performed by: FAMILY MEDICINE

## 2024-10-28 PROCEDURE — G2211 COMPLEX E/M VISIT ADD ON: HCPCS | Performed by: FAMILY MEDICINE

## 2024-10-28 PROCEDURE — 3075F SYST BP GE 130 - 139MM HG: CPT | Performed by: FAMILY MEDICINE

## 2024-10-28 PROCEDURE — 99215 OFFICE O/P EST HI 40 MIN: CPT | Performed by: FAMILY MEDICINE

## 2024-10-28 PROCEDURE — 3008F BODY MASS INDEX DOCD: CPT | Performed by: FAMILY MEDICINE

## 2024-10-28 PROCEDURE — 3046F HEMOGLOBIN A1C LEVEL >9.0%: CPT | Performed by: FAMILY MEDICINE

## 2024-10-28 RX ORDER — METFORMIN HYDROCHLORIDE 500 MG/1
TABLET, EXTENDED RELEASE ORAL
Qty: 270 TABLET | Refills: 1 | Status: SHIPPED | OUTPATIENT
Start: 2024-10-28

## 2024-10-28 RX ORDER — PIOGLITAZONEHYDROCHLORIDE 15 MG/1
15 TABLET ORAL DAILY
Qty: 90 TABLET | Refills: 1 | Status: SHIPPED | OUTPATIENT
Start: 2024-10-28

## 2024-10-28 RX ORDER — LANCETS 28 GAUGE
1 EACH MISCELLANEOUS DAILY
Qty: 100 EACH | Refills: 1 | Status: SHIPPED | OUTPATIENT
Start: 2024-10-28 | End: 2025-10-28

## 2024-10-28 RX ORDER — METOPROLOL SUCCINATE 50 MG/1
50 TABLET, EXTENDED RELEASE ORAL DAILY
Qty: 90 TABLET | Refills: 1 | Status: SHIPPED | OUTPATIENT
Start: 2024-10-28

## 2024-10-28 RX ORDER — BLOOD-GLUCOSE METER
KIT MISCELLANEOUS
Qty: 100 STRIP | Refills: 1 | Status: SHIPPED | OUTPATIENT
Start: 2024-10-28 | End: 2024-11-04

## 2024-10-28 RX ORDER — LOSARTAN POTASSIUM AND HYDROCHLOROTHIAZIDE 12.5; 5 MG/1; MG/1
1 TABLET ORAL DAILY
Qty: 90 TABLET | Refills: 1 | Status: SHIPPED | OUTPATIENT
Start: 2024-10-28 | End: 2025-10-23

## 2024-10-28 RX ORDER — ATORVASTATIN CALCIUM 20 MG/1
20 TABLET, FILM COATED ORAL NIGHTLY
Qty: 90 TABLET | Refills: 1 | Status: SHIPPED | OUTPATIENT
Start: 2024-10-28

## 2024-10-28 RX ORDER — HYDROCHLOROTHIAZIDE 25 MG/1
25 TABLET ORAL DAILY
Qty: 90 TABLET | Refills: 1 | Status: CANCELLED | OUTPATIENT
Start: 2024-10-28

## 2024-10-28 NOTE — PATIENT INSTRUCTIONS
Pare hydrochlorothiazide    Empeza losartan-hydrochlorothiazide 50 - 12.5mg kayla pastilla diario    Empeza atorvastatin 20mg cada lucius para wagner colesterol    Continua todo las otras medicinas    Va a lab para shannan kayla semana antes de wagner proxima alphonso    Regresa en 3 meses

## 2024-10-29 ENCOUNTER — TELEPHONE (OUTPATIENT)
Dept: FAMILY MEDICINE CLINIC | Facility: CLINIC | Age: 59
End: 2024-10-29

## 2024-10-31 ENCOUNTER — TELEPHONE (OUTPATIENT)
Dept: FAMILY MEDICINE CLINIC | Facility: CLINIC | Age: 59
End: 2024-10-31

## 2024-10-31 DIAGNOSIS — E11.9 TYPE 2 DIABETES MELLITUS WITHOUT COMPLICATION, WITHOUT LONG-TERM CURRENT USE OF INSULIN (HCC): Primary | ICD-10-CM

## 2024-10-31 NOTE — TELEPHONE ENCOUNTER
Patient calling via [employer] Line - he would like a revised rx for Glucose Blood (FREESTYLE LITE TEST) In Vitro Strip  to state that he tests twice a day and not once a day so the strips will be covered by insurance as well    Please advise.

## 2024-11-01 NOTE — PROGRESS NOTES
Bruce Russo is a 59 year old male here for   Chief Complaint   Patient presents with    Diabetes       HPI:       1. Type 2 diabetes mellitus without complication, without long-term current use of insulin (HCC)  -Last A1c value was 13.2% done 10/8/2024.    -last eye exam: Last Diabetic Eye Exam:  Last Dilated Eye Exam: 10/08/24  Eye Exam shows Diabetic Retinopathy?: No    -denies hyper or hypo glycemic symptoms   -tolerating meds  -fasting sugars around 130s,  - 220s  -eating better  -established with DM clinic and nutritionist which are helping    2. Essential hypertension  -bp stable, though given DM, would benefit from ACE/ARB    3. Mixed hyperlipidemia  -    4. Balanitis  -better with ketoconazole2        HISTORY:  Past Medical History:    Bloating    Diarrhea, unspecified    Essential hypertension    Eye disease    Flatulence/gas pain/belching    Hearing loss    Sleep disturbance    Uncomfortable fullness after meals    Wears glasses      Past Surgical History:   Procedure Laterality Date    Eye surgery Bilateral     Cataract    Eye surgery Bilateral     Lasik    Lasik - os - left eye Left 10/14/2024      Family History   Problem Relation Age of Onset    High Blood Pressure Mother     Hypertension Mother     Diabetes Father     High Blood Pressure Father     Heart Disorder Maternal Grandmother     Heart Attack Maternal Grandmother     Heart Disorder Maternal Grandfather     Heart Attack Maternal Grandfather     Stroke Maternal Grandfather     Cancer Paternal Grandmother     Diabetes Paternal Grandfather     Hypertension Paternal Grandfather     Hypertension Brother     Heart Attack Maternal Aunt     Heart Attack Paternal Aunt       Social History:   Social History     Socioeconomic History    Marital status:    Tobacco Use    Smoking status: Never    Smokeless tobacco: Never   Vaping Use    Vaping status: Never Used   Substance and Sexual Activity    Alcohol use: Not Currently      Comment: social     Drug use: No   Other Topics Concern    Caffeine Concern No     Comment: 1 cup of coffee and 1 cup of tea daily    Stress Concern Yes    Weight Concern Yes    Special Diet No    Exercise Yes     Comment: walks 7 x weekly    Seat Belt Yes        Medications (Active prior to today's visit):  Current Outpatient Medications   Medication Sig Dispense Refill    FreeStyle Lancets Does not apply Misc 1 Lancet by Finger stick route daily. Dx: 11.65 100 each 1    Glucose Blood (FREESTYLE LITE TEST) In Vitro Strip Use as directed once daily. Dx: 11.65 100 strip 1    metFORMIN  MG Oral Tablet 24 Hr 2 tab in AM,  1 tab in  tablet 1    metoprolol succinate ER 50 MG Oral Tablet 24 Hr Take 1 tablet (50 mg total) by mouth daily. 90 tablet 1    pioglitazone 15 MG Oral Tab Take 1 tablet (15 mg total) by mouth daily. 90 tablet 1    losartan-hydroCHLOROthiazide 50-12.5 MG Oral Tab Take 1 tablet by mouth daily. 90 tablet 1    atorvastatin 20 MG Oral Tab Take 1 tablet (20 mg total) by mouth nightly. 90 tablet 1    glimepiride 2 MG Oral Tab 1/2 tablet daily in am with first meal 45 tablet 0    prednisoLONE 1 % Ophthalmic Suspension INSTILL 1 DROP INTO BOTH EYES ONCE A DAY      Blood Glucose Monitoring Suppl (FREESTYLE FREEDOM LITE) w/Device Does not apply Kit 1 Device by Other route daily. Use as directed. Dx: 11.65 1 kit 0    ketoconazole 2 % External Cream Apply 1 Application topically 2 (two) times daily. (Patient not taking: Reported on 10/28/2024) 60 g 1    Tadalafil (CIALIS) 20 MG Oral Tab Take 0.5-1 tablets (10-20 mg total) by mouth daily as needed for Erectile Dysfunction. (Patient not taking: Reported on 10/28/2024) 20 tablet 0    nabumetone 500 MG Oral Tab Take 1 tablet (500 mg total) by mouth 2 (two) times daily as needed for Pain. (Patient not taking: Reported on 10/28/2024) 30 tablet 1       Allergies:  Allergies[1]      ROS:   See HPI for relevant ROS    --GEN: No other complaints  --HEENT: No  other complaints  --RESP: No other complaints  --CV: No other complaints  --GI: No other complaints  --MSK: No other complaints    All other systems reviewed are negative    PHYSICAL EXAM:   /82 (BP Location: Left arm, Patient Position: Sitting, Cuff Size: adult)   Pulse 59   Temp 98 °F (36.7 °C) (Temporal)   Resp 18   Ht 5' 7\" (1.702 m)   Wt 177 lb 6.4 oz (80.5 kg)   SpO2 96%   BMI 27.78 kg/m²     Gen: NAD  HEENT: NCAT, pupils equal and round  Pulm: CTAB, no wheezing  CV: RRR  Ext: full ROM  Psych: normal affect     ASSESSMENT/PLAN:     1. Type 2 diabetes mellitus without complication, without long-term current use of insulin (HCC)  -sugars improving  -plan to recheck A1c in 2-3 months  -c/w lifestyle changes  -c/w meds  -c/w tracking sugars  -add statin and arb today  -recheck labs before next appt    - Comp Metabolic Panel (14); Future  - Lipid Panel; Future  - Hemoglobin A1C; Future    2. Essential hypertension  -decrease hydrochlorothiazide to 12.5 and add losartan 50  -c/w metoprolol  -track BP at home    - metoprolol succinate ER 50 MG Oral Tablet 24 Hr; Take 1 tablet (50 mg total) by mouth daily.  Dispense: 90 tablet; Refill: 1    3. Mixed hyperlipidemia  -start atorvastatin 20  -risks and side effects of med discussed, patient expressed understanding    4. Balanitis  -improving  -c/w ketoconazole prn  -would expect symptoms to resolve once sugars normalized        Chronic Conditions:    No problem-specific Assessment & Plan notes found for this encounter.       Health Maintenance:  Health Maintenance   Topic Date Due    Diabetes Care: Microalb/Creat Ratio  Never done    Pneumococcal Vaccine: Birth to 64yrs (1 of 2 - PCV) Never done    DTaP,Tdap,and Td Vaccines (1 - Tdap) Never done    Zoster Vaccines (1 of 2) Never done    COVID-19 Vaccine (3 - 2023-24 season) 09/01/2024    Influenza Vaccine (1) 11/30/2024 (Originally 10/1/2024)    Diabetes Care A1C  01/08/2025    Annual Physical  09/16/2025     Diabetes Care Foot Exam  2025    Diabetes Care Dilated Eye Exam  10/08/2025    Diabetes Care: GFR  10/08/2025    Colorectal Cancer Screening  2026    PSA  10/08/2026    Annual Depression Screening  Completed               The patient is asked to return in 3 months.    Orders This Visit:  Orders Placed This Encounter   Procedures    Comp Metabolic Panel (14)    Lipid Panel    Hemoglobin A1C       Meds This Visit:  Requested Prescriptions     Signed Prescriptions Disp Refills    FreeStyle Lancets Does not apply Misc 100 each 1     Si Lancet by Finger stick route daily. Dx: 11.65    Glucose Blood (FREESTYLE LITE TEST) In Vitro Strip 100 strip 1     Sig: Use as directed once daily. Dx: 11.65    metFORMIN  MG Oral Tablet 24 Hr 270 tablet 1     Si tab in AM,  1 tab in PM    metoprolol succinate ER 50 MG Oral Tablet 24 Hr 90 tablet 1     Sig: Take 1 tablet (50 mg total) by mouth daily.    pioglitazone 15 MG Oral Tab 90 tablet 1     Sig: Take 1 tablet (15 mg total) by mouth daily.    losartan-hydroCHLOROthiazide 50-12.5 MG Oral Tab 90 tablet 1     Sig: Take 1 tablet by mouth daily.    atorvastatin 20 MG Oral Tab 90 tablet 1     Sig: Take 1 tablet (20 mg total) by mouth nightly.       Imaging & Referrals:  None     JOANNE OLMSTEAD MD    I spent a total of 40 minutes, more than half of which was spent counseling/coordinating care regarding dm       [1]   Allergies  Allergen Reactions    Cefaclor RASH

## 2024-11-04 ENCOUNTER — MED REC SCAN ONLY (OUTPATIENT)
Dept: FAMILY MEDICINE CLINIC | Facility: CLINIC | Age: 59
End: 2024-11-04

## 2024-11-04 RX ORDER — BLOOD-GLUCOSE METER
KIT MISCELLANEOUS
Qty: 100 STRIP | Refills: 1 | Status: SHIPPED | OUTPATIENT
Start: 2024-11-04

## 2024-11-20 ENCOUNTER — TELEPHONE (OUTPATIENT)
Age: 59
End: 2024-11-20

## 2024-11-20 NOTE — TELEPHONE ENCOUNTER
Pt emailed BG readings to my email with no message, unsure how they got my email to be able to send that. Called and LVM for patient to call back as too why they sent in BG log readings to carl email.

## 2025-01-24 ENCOUNTER — TELEPHONE (OUTPATIENT)
Dept: FAMILY MEDICINE CLINIC | Facility: CLINIC | Age: 60
End: 2025-01-24

## 2025-01-24 LAB
AMB EXT CALCIUM: 9.4
AMB EXT CHLORIDE: 105
AMB EXT CHOL/HDL RATIO: 5.1
AMB EXT CHOLESTEROL, TOTAL: 157 MG/DL
AMB EXT CREATININE, URINE: 156 MG/DL
AMB EXT EGFR NON-AA: 101
AMB EXT GLUCOSE: 89 MG/DL
AMB EXT HDL CHOLESTEROL: 31 MG/DL
AMB EXT HGBA1C: 5.8 %
AMB EXT LDL CHOLESTEROL, DIRECT: 92 MG/DL
AMB EXT MALB/CRE CALC: 6 UG/MG
AMB EXT NON HDL CHOL: 126 MG/DL
AMB EXT POSTASSIUM: 4 MMOL/L
AMB EXT SODIUM: 143 MMOL/L
AMB EXT TOTAL PROTEIN: 6.9
AMB EXT TRIGLYCERIDES: 256 MG/DL

## 2025-01-24 NOTE — TELEPHONE ENCOUNTER
Patient called to have lab orders to be sent to [facility] [place] [facility]   Orders faxed to [phone]

## 2025-02-03 ENCOUNTER — OFFICE VISIT (OUTPATIENT)
Dept: FAMILY MEDICINE CLINIC | Facility: CLINIC | Age: 60
End: 2025-02-03
Payer: COMMERCIAL

## 2025-02-03 VITALS
RESPIRATION RATE: 18 BRPM | SYSTOLIC BLOOD PRESSURE: 136 MMHG | HEART RATE: 65 BPM | OXYGEN SATURATION: 95 % | TEMPERATURE: 98 F | BODY MASS INDEX: 28.41 KG/M2 | WEIGHT: 181 LBS | DIASTOLIC BLOOD PRESSURE: 78 MMHG | HEIGHT: 67 IN

## 2025-02-03 DIAGNOSIS — E78.2 MIXED HYPERLIPIDEMIA: ICD-10-CM

## 2025-02-03 DIAGNOSIS — I10 ESSENTIAL HYPERTENSION: ICD-10-CM

## 2025-02-03 DIAGNOSIS — E11.9 TYPE 2 DIABETES MELLITUS WITHOUT COMPLICATION, WITHOUT LONG-TERM CURRENT USE OF INSULIN (HCC): Primary | ICD-10-CM

## 2025-02-03 PROCEDURE — 99215 OFFICE O/P EST HI 40 MIN: CPT | Performed by: FAMILY MEDICINE

## 2025-02-03 RX ORDER — GLIMEPIRIDE 1 MG/1
1 TABLET ORAL
Qty: 90 TABLET | Refills: 1 | Status: SHIPPED | OUTPATIENT
Start: 2025-02-03

## 2025-02-03 RX ORDER — PIOGLITAZONE 15 MG/1
15 TABLET ORAL DAILY
Qty: 90 TABLET | Refills: 1 | Status: SHIPPED | OUTPATIENT
Start: 2025-02-03

## 2025-02-03 RX ORDER — LOSARTAN POTASSIUM AND HYDROCHLOROTHIAZIDE 12.5; 5 MG/1; MG/1
1 TABLET ORAL DAILY
Qty: 90 TABLET | Refills: 1 | Status: SHIPPED | OUTPATIENT
Start: 2025-02-03 | End: 2026-01-29

## 2025-02-03 RX ORDER — METFORMIN HYDROCHLORIDE 500 MG/1
500 TABLET, EXTENDED RELEASE ORAL
Qty: 90 TABLET | Refills: 1 | Status: SHIPPED | OUTPATIENT
Start: 2025-02-03

## 2025-02-03 RX ORDER — ATORVASTATIN CALCIUM 40 MG/1
40 TABLET, FILM COATED ORAL NIGHTLY
Qty: 90 TABLET | Refills: 1 | Status: SHIPPED | OUTPATIENT
Start: 2025-02-03

## 2025-02-03 RX ORDER — METOPROLOL SUCCINATE 50 MG/1
50 TABLET, EXTENDED RELEASE ORAL DAILY
Qty: 90 TABLET | Refills: 1 | Status: SHIPPED | OUTPATIENT
Start: 2025-02-03

## 2025-02-03 NOTE — PATIENT INSTRUCTIONS
Continua todo vincenzo medicina para azucar    Sube atorvastatin a 40mg cada noche    Empeza glimepiride 1mg cada lucius    Empeza metformin 500mg cada manana - no necessita mas    Continua dieta galileo    Regresa en 3 meses

## 2025-02-04 ENCOUNTER — MED REC SCAN ONLY (OUTPATIENT)
Dept: FAMILY MEDICINE CLINIC | Facility: CLINIC | Age: 60
End: 2025-02-04

## 2025-02-04 NOTE — PROGRESS NOTES
Bruce Russo is a 59 year old male here for   Chief Complaint   Patient presents with    Diabetes       HPI:       1. Type 2 diabetes mellitus without complication, without long-term current use of insulin (HCC)  -Last A1c value was 5.8% done last week    -last eye exam: Last Diabetic Eye Exam:  Last Dilated Eye Exam: 10/08/24  Eye Exam shows Diabetic Retinopathy?: No    -denies hyper or hypo glycemic symptoms   -eating better  -tolerating meds - metformin, glimepiride, actos     2. Essential hypertension  -at goal on metoprolol and losartan-hydrochlorothiazide    3. Mixed hyperlipidemia  -tolerating statin - atorva 20  -LDL 90s on recent labs        HISTORY:  Past Medical History:    Bloating    Diarrhea, unspecified    Essential hypertension    Eye disease    Flatulence/gas pain/belching    Hearing loss    Sleep disturbance    Uncomfortable fullness after meals    Wears glasses      Past Surgical History:   Procedure Laterality Date    Eye surgery Bilateral     Cataract    Eye surgery Bilateral     Lasik    Lasik - os - left eye Left 10/14/2024      Family History   Problem Relation Age of Onset    High Blood Pressure Mother     Hypertension Mother     Diabetes Father     High Blood Pressure Father     Heart Disorder Maternal Grandmother     Heart Attack Maternal Grandmother     Heart Disorder Maternal Grandfather     Heart Attack Maternal Grandfather     Stroke Maternal Grandfather     Cancer Paternal Grandmother     Diabetes Paternal Grandfather     Hypertension Paternal Grandfather     Hypertension Brother     Heart Attack Maternal Aunt     Heart Attack Paternal Aunt       Social History:   Social History     Socioeconomic History    Marital status:    Tobacco Use    Smoking status: Never    Smokeless tobacco: Never   Vaping Use    Vaping status: Never Used   Substance and Sexual Activity    Alcohol use: Not Currently     Comment: social     Drug use: No   Other Topics Concern    Caffeine Concern No      Comment: 1 cup of coffee and 1 cup of tea daily    Stress Concern Yes    Weight Concern Yes    Special Diet No    Exercise Yes     Comment: walks 7 x weekly    Seat Belt Yes     Social Drivers of Health     Food Insecurity: No Food Insecurity (2/3/2025)    NCSS - Food Insecurity     Worried About Running Out of Food in the Last Year: No     Ran Out of Food in the Last Year: No   Transportation Needs: No Transportation Needs (2/3/2025)    NCSS - Transportation     Lack of Transportation: No   Housing Stability: Not At Risk (2/3/2025)    NCSS - Housing/Utilities     Has Housing: Yes     Worried About Losing Housing: No     Unable to Get Utilities: No        Medications (Active prior to today's visit):  Current Outpatient Medications   Medication Sig Dispense Refill    pioglitazone 15 MG Oral Tab Take 1 tablet (15 mg total) by mouth daily. 90 tablet 1    metoprolol succinate ER 50 MG Oral Tablet 24 Hr Take 1 tablet (50 mg total) by mouth daily. 90 tablet 1    losartan-hydroCHLOROthiazide 50-12.5 MG Oral Tab Take 1 tablet by mouth daily. 90 tablet 1    atorvastatin 40 MG Oral Tab Take 1 tablet (40 mg total) by mouth nightly. 90 tablet 1    metFORMIN  MG Oral Tablet 24 Hr Take 1 tablet (500 mg total) by mouth daily with breakfast. 90 tablet 1    glimepiride 1 MG Oral Tab Take 1 tablet (1 mg total) by mouth daily with breakfast. 90 tablet 1    Glucose Blood (FREESTYLE LITE TEST) In Vitro Strip Use as directed bid. Dx: 11.65 100 strip 1    FreeStyle Lancets Does not apply Misc 1 Lancet by Finger stick route daily. Dx: 11.65 100 each 1    prednisoLONE 1 % Ophthalmic Suspension INSTILL 1 DROP INTO BOTH EYES ONCE A DAY      Blood Glucose Monitoring Suppl (FREESTYLE FREEDOM LITE) w/Device Does not apply Kit 1 Device by Other route daily. Use as directed. Dx: 11.65 1 kit 0    ketoconazole 2 % External Cream Apply 1 Application topically 2 (two) times daily. (Patient not taking: Reported on 2/3/2025) 60 g 1    Tadalafil  (CIALIS) 20 MG Oral Tab Take 0.5-1 tablets (10-20 mg total) by mouth daily as needed for Erectile Dysfunction. (Patient not taking: Reported on 9/16/2024) 20 tablet 0    nabumetone 500 MG Oral Tab Take 1 tablet (500 mg total) by mouth 2 (two) times daily as needed for Pain. (Patient not taking: Reported on 9/16/2024) 30 tablet 1       Allergies:  Allergies[1]      ROS:   See HPI for relevant ROS    --GEN: No other complaints  --HEENT: No other complaints  --RESP: No other complaints  --CV: No other complaints  --GI: No other complaints  --MSK: No other complaints    All other systems reviewed are negative    PHYSICAL EXAM:   /78 (BP Location: Left arm, Patient Position: Sitting, Cuff Size: adult)   Pulse 65   Temp 98 °F (36.7 °C) (Temporal)   Resp 18   Ht 5' 7\" (1.702 m)   Wt 181 lb (82.1 kg)   SpO2 95%   BMI 28.35 kg/m²     Gen: NAD  HEENT: NCAT, pupils equal and round  Pulm: CTAB, no wheezing  CV: RRR  Ext: full ROM  Psych: normal affect    Bilateral barefoot skin diabetic exam is normal, visualized feet and the appearance is normal.  Bilateral monofilament/sensation of both feet is normal.  Pulsation pedal pulse exam of both lower legs/feet is normal as well.     ASSESSMENT/PLAN:     1. Type 2 diabetes mellitus without complication, without long-term current use of insulin (HCC)  -doing very well  -sugars much better  -c/w current meds  -foot exam normal  -up to date on eye exam  -micro normal - will need to abstract into chart  -f/u in 3 months    2. Essential hypertension  -at goal  -c/w metoprolol ER 50, and losartan-hydrochlorothiazide 50-12.5    3. Mixed hyperlipidemia  -LDL not at goal in 90s  -increase atorvastatin to 40mg daily  -f/u in 3 months        Chronic Conditions:    No problem-specific Assessment & Plan notes found for this encounter.       Health Maintenance:  Health Maintenance   Topic Date Due    Pneumococcal Vaccine: 50+ Years (1 of 2 - PCV) Never done    Zoster Vaccines (1 of 2)  Never done    COVID-19 Vaccine (3 - 2024-25 season) 09/01/2024    Diabetes Care: Microalb/Creat Ratio (Annual)  Never done    Diabetes Care A1C  01/08/2025    Influenza Vaccine (1) 06/30/2025 (Originally 10/1/2024)    Annual Physical  09/16/2025    Diabetes Care Dilated Eye Exam  10/08/2025    Diabetes Care: GFR  10/08/2025    Colorectal Cancer Screening  03/02/2026    PSA  10/08/2026    DTaP,Tdap,and Td Vaccines (2 - Tdap) 07/15/2034    Annual Depression Screening  Completed    Diabetes Care: Foot Exam (Annual)  Completed    Meningococcal B Vaccine  Aged Out               The patient is asked to return in 3 months.    Orders This Visit:  No orders of the defined types were placed in this encounter.      Meds This Visit:  Requested Prescriptions     Signed Prescriptions Disp Refills    pioglitazone 15 MG Oral Tab 90 tablet 1     Sig: Take 1 tablet (15 mg total) by mouth daily.    metoprolol succinate ER 50 MG Oral Tablet 24 Hr 90 tablet 1     Sig: Take 1 tablet (50 mg total) by mouth daily.    losartan-hydroCHLOROthiazide 50-12.5 MG Oral Tab 90 tablet 1     Sig: Take 1 tablet by mouth daily.    atorvastatin 40 MG Oral Tab 90 tablet 1     Sig: Take 1 tablet (40 mg total) by mouth nightly.    metFORMIN  MG Oral Tablet 24 Hr 90 tablet 1     Sig: Take 1 tablet (500 mg total) by mouth daily with breakfast.    glimepiride 1 MG Oral Tab 90 tablet 1     Sig: Take 1 tablet (1 mg total) by mouth daily with breakfast.       Imaging & Referrals:  None     JOANNE OLMSTEAD MD    I spent a total of 40 minutes, more than half of which was spent counseling/coordinating care regarding dm, htn, lipids       [1]   Allergies  Allergen Reactions    Cefaclor RASH

## 2025-05-05 ENCOUNTER — OFFICE VISIT (OUTPATIENT)
Dept: FAMILY MEDICINE CLINIC | Facility: CLINIC | Age: 60
End: 2025-05-05
Payer: COMMERCIAL

## 2025-05-05 VITALS
RESPIRATION RATE: 18 BRPM | WEIGHT: 186.63 LBS | DIASTOLIC BLOOD PRESSURE: 76 MMHG | TEMPERATURE: 98 F | SYSTOLIC BLOOD PRESSURE: 112 MMHG | BODY MASS INDEX: 29.29 KG/M2 | HEART RATE: 78 BPM | HEIGHT: 67 IN | OXYGEN SATURATION: 95 %

## 2025-05-05 DIAGNOSIS — E78.2 MIXED HYPERLIPIDEMIA: ICD-10-CM

## 2025-05-05 DIAGNOSIS — E11.9 TYPE 2 DIABETES MELLITUS WITHOUT COMPLICATION, WITHOUT LONG-TERM CURRENT USE OF INSULIN (HCC): Primary | ICD-10-CM

## 2025-05-05 DIAGNOSIS — I10 ESSENTIAL HYPERTENSION: ICD-10-CM

## 2025-05-05 LAB — HEMOGLOBIN A1C: 5.4 % (ref 4.3–5.6)

## 2025-05-05 RX ORDER — LANCETS 28 GAUGE
1 EACH MISCELLANEOUS DAILY
Qty: 100 EACH | Refills: 1 | Status: SHIPPED | OUTPATIENT
Start: 2025-05-05 | End: 2026-05-05

## 2025-05-05 RX ORDER — BLOOD-GLUCOSE METER
KIT MISCELLANEOUS
Qty: 100 STRIP | Refills: 1 | Status: SHIPPED | OUTPATIENT
Start: 2025-05-05

## 2025-05-05 NOTE — PATIENT INSTRUCTIONS
Pare hydrochlorothiazide 25mg    Pare glimepiride 1mg    Continua losartan-hydrochlorothiazide 50-12.5 para presion    Continua metformin y pioglitazone para azucar    Continua atorvastatin en la noche    Revisa wagner presion 2 veces al semana    Revisa wagner azucar y si esta mas de 115 en las mananas, puede empezar glimepiride 1mg otra vez

## 2025-05-06 NOTE — PROGRESS NOTES
Bruce Russo is a 60 year old male here for   Chief Complaint   Patient presents with    Follow - Up     Diabetes      Urinary     Patient c/o leakage urine        HPI:       1. Type 2 diabetes mellitus without complication, without long-term current use of insulin (HCC)  -Last A1c value was 5.4% done 5/5/2025.    -last eye exam: Last Diabetic Eye Exam:  Last Dilated Eye Exam: 10/08/24  Eye Exam shows Diabetic Retinopathy?: No    -denies hyper or hypo glycemic symptoms     2. Essential hypertension  -bp at goal  -however, in addition to losartan-hydrochlorothiazide 50-12.5, he also had been taking hydrochlorothiazide 25 which the pharmacy inadvertently filled   -he is no longer taking metoprolol    3. Mixed hyperlipidemia  -tolerating statin        HISTORY:  Past Medical History[1]   Past Surgical History[2]   Family History[3]   Social History: Short Social Hx on File[4]     Medications (Active prior to today's visit):  Current Medications[5]    Allergies:  Allergies[6]      ROS:   See HPI for relevant ROS    --GEN: No other complaints  --HEENT: No other complaints  --RESP: No other complaints  --CV: No other complaints  --GI: No other complaints  --MSK: No other complaints    All other systems reviewed are negative    PHYSICAL EXAM:   /76 (BP Location: Left arm, Patient Position: Sitting, Cuff Size: adult)   Pulse 78   Temp 97.9 °F (36.6 °C) (Temporal)   Resp 18   Ht 5' 7\" (1.702 m)   Wt 186 lb 9.6 oz (84.6 kg)   SpO2 95%   BMI 29.23 kg/m²     Gen: NAD  HEENT: NCAT, pupils equal and round  Pulm: CTAB, no wheezing  CV: RRR  Ext: full ROM  Psych: normal affect     ASSESSMENT/PLAN:     1. Type 2 diabetes mellitus without complication, without long-term current use of insulin (HCC)  -reviewed meds at length  -c/w metformin, actos  -c/w lifestyle changes  -up to date on eye, foot exam  -up to date on micro    - POC Hemoglobin A1C    2. Essential hypertension  -c/w losartan-hydrochlorothiazide  -stop  hydrochlorothiazide 25  -stop metoprolol  -check BP at home  -f/u in 6 months, sooner prn    3. Mixed hyperlipidemia  -stable, c/w statin        Chronic Conditions:    No problem-specific Assessment & Plan notes found for this encounter.       Health Maintenance:  Health Maintenance   Topic Date Due    Pneumococcal Vaccine: 50+ Years (1 of 2 - PCV) Never done    Zoster Vaccines (1 of 2) Never done    COVID-19 Vaccine (3 -  season) 2024    Annual Physical  2025    Influenza Vaccine (Season Ended) 10/01/2025    Diabetes Care Dilated Eye Exam  10/08/2025    Diabetes Care A1C  2025    Diabetes Care: GFR  2026    Colorectal Cancer Screening  2026    PSA  10/08/2026    DTaP,Tdap,and Td Vaccines (2 - Tdap) 07/15/2034    Annual Depression Screening  Completed    Diabetes Care: Foot Exam (Annual)  Completed    Diabetes Care: Microalb/Creat Ratio (Annual)  Completed    Meningococcal B Vaccine  Aged Out               The patient is asked to return in 6 months.    Orders This Visit:  Orders Placed This Encounter   Procedures    POC Hemoglobin A1C       Meds This Visit:  Requested Prescriptions     Signed Prescriptions Disp Refills    FreeStyle Lancets Does not apply Misc 100 each 1     Si Lancet by Finger stick route daily. Dx: 11.65    Glucose Blood (FREESTYLE LITE TEST) In Vitro Strip 100 strip 1     Sig: Use as directed bid. Dx: 11.65       Imaging & Referrals:  None     JOANNE OLMSTEAD MD    I spent a total of 40 minutes, more than half of which was spent counseling/coordinating care regarding dm, htn, lipids       [1]   Past Medical History:   Bloating    Diarrhea, unspecified    Essential hypertension    Eye disease    Flatulence/gas pain/belching    Hearing loss    Sleep disturbance    Uncomfortable fullness after meals    Wears glasses   [2]   Past Surgical History:  Procedure Laterality Date    Eye surgery Bilateral     Cataract    Eye surgery Bilateral     Lasik    Lasik - os -  left eye Left 10/14/2024   [3]   Family History  Problem Relation Age of Onset    High Blood Pressure Mother     Hypertension Mother     Diabetes Father     High Blood Pressure Father     Heart Disorder Maternal Grandmother     Heart Attack Maternal Grandmother     Heart Disorder Maternal Grandfather     Heart Attack Maternal Grandfather     Stroke Maternal Grandfather     Cancer Paternal Grandmother     Diabetes Paternal Grandfather     Hypertension Paternal Grandfather     Hypertension Brother     Heart Attack Maternal Aunt     Heart Attack Paternal Aunt    [4]   Social History  Socioeconomic History    Marital status:    Tobacco Use    Smoking status: Never    Smokeless tobacco: Never   Vaping Use    Vaping status: Never Used   Substance and Sexual Activity    Alcohol use: Not Currently    Drug use: No   Other Topics Concern    Caffeine Concern No     Comment: 1 cup of coffee and 1 cup of tea daily    Stress Concern Yes    Weight Concern Yes    Special Diet No    Exercise Yes     Comment: walks 7 x weekly    Seat Belt Yes     Social Drivers of Health     Food Insecurity: No Food Insecurity (2/3/2025)    NCSS - Food Insecurity     Worried About Running Out of Food in the Last Year: No     Ran Out of Food in the Last Year: No   Transportation Needs: No Transportation Needs (2/3/2025)    NCSS - Transportation     Lack of Transportation: No   Housing Stability: Not At Risk (2/3/2025)    NCSS - Housing/Utilities     Has Housing: Yes     Worried About Losing Housing: No     Unable to Get Utilities: No   [5]   Current Outpatient Medications   Medication Sig Dispense Refill    FreeStyle Lancets Does not apply Misc 1 Lancet by Finger stick route daily. Dx: 11.65 100 each 1    Glucose Blood (FREESTYLE LITE TEST) In Vitro Strip Use as directed bid. Dx: 11.65 100 strip 1    pioglitazone 15 MG Oral Tab Take 1 tablet (15 mg total) by mouth daily. 90 tablet 1    losartan-hydroCHLOROthiazide 50-12.5 MG Oral Tab Take 1  tablet by mouth daily. 90 tablet 1    atorvastatin 40 MG Oral Tab Take 1 tablet (40 mg total) by mouth nightly. 90 tablet 1    metFORMIN  MG Oral Tablet 24 Hr Take 1 tablet (500 mg total) by mouth daily with breakfast. 90 tablet 1    Blood Glucose Monitoring Suppl (FREESTYLE FREEDOM LITE) w/Device Does not apply Kit 1 Device by Other route daily. Use as directed. Dx: 11.65 1 kit 0    ketoconazole 2 % External Cream Apply 1 Application topically 2 (two) times daily. (Patient not taking: Reported on 5/5/2025) 60 g 1    Tadalafil (CIALIS) 20 MG Oral Tab Take 0.5-1 tablets (10-20 mg total) by mouth daily as needed for Erectile Dysfunction. (Patient not taking: Reported on 5/5/2025) 20 tablet 0   [6]   Allergies  Allergen Reactions    Cefaclor RASH

## 2025-06-16 ENCOUNTER — TELEPHONE (OUTPATIENT)
Dept: FAMILY MEDICINE CLINIC | Facility: CLINIC | Age: 60
End: 2025-06-16
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
## 2025-06-16 NOTE — TELEPHONE ENCOUNTER
Future Appointments   Date Time Provider Department Center   6/17/2025  9:30 AM Janes Gale MD [facility]

## 2025-06-17 ENCOUNTER — TELEPHONE (OUTPATIENT)
Dept: FAMILY MEDICINE CLINIC | Facility: CLINIC | Age: 60
End: 2025-06-17

## 2025-06-17 ENCOUNTER — MED REC SCAN ONLY (OUTPATIENT)
Dept: FAMILY MEDICINE CLINIC | Facility: CLINIC | Age: 60
End: 2025-06-17

## 2025-06-17 ENCOUNTER — OFFICE VISIT (OUTPATIENT)
Dept: FAMILY MEDICINE CLINIC | Facility: CLINIC | Age: 60
End: 2025-06-17
Payer: COMMERCIAL

## 2025-06-17 VITALS
WEIGHT: 186 LBS | HEART RATE: 89 BPM | OXYGEN SATURATION: 94 % | DIASTOLIC BLOOD PRESSURE: 72 MMHG | BODY MASS INDEX: 29.19 KG/M2 | TEMPERATURE: 98 F | HEIGHT: 67 IN | RESPIRATION RATE: 18 BRPM | SYSTOLIC BLOOD PRESSURE: 146 MMHG
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
## 2025-06-17 DIAGNOSIS — S99.921A INJURY OF RIGHT FOOT, INITIAL ENCOUNTER: ICD-10-CM

## 2025-06-17 PROCEDURE — 99215 OFFICE O/P EST HI 40 MIN: CPT | Performed by: FAMILY MEDICINE

## 2025-06-17 RX ORDER — NABUMETONE 500 MG/1
500 TABLET, FILM COATED ORAL 2 TIMES DAILY PRN
Qty: 30 TABLET | Refills: 1 | Status: SHIPPED | OUTPATIENT
Start: 2025-06-17

## 2025-06-17 RX ORDER — IBUPROFEN 600 MG/1
TABLET, FILM COATED ORAL
COMMUNITY
Start: 2025-06-09

## 2025-06-17 NOTE — PATIENT INSTRUCTIONS
Empeza nabumetone dos veces al lucius con comida por kayla semanas, despues caundo necessita    Hielo    Ejercisios de wagner pie    No trabajo por 4 semanas    Regresa en 2 de Julio
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
## 2025-07-02 ENCOUNTER — OFFICE VISIT (OUTPATIENT)
Dept: FAMILY MEDICINE CLINIC | Facility: CLINIC | Age: 60
End: 2025-07-02
Payer: COMMERCIAL

## 2025-07-02 VITALS
DIASTOLIC BLOOD PRESSURE: 74 MMHG | RESPIRATION RATE: 18 BRPM | WEIGHT: 183.63 LBS | HEART RATE: 74 BPM | SYSTOLIC BLOOD PRESSURE: 136 MMHG | TEMPERATURE: 98 F | HEIGHT: 67 IN | BODY MASS INDEX: 28.82 KG/M2 | OXYGEN SATURATION: 96 %
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
## 2025-07-02 DIAGNOSIS — S99.921A INJURY OF RIGHT FOOT, INITIAL ENCOUNTER: ICD-10-CM

## 2025-07-02 PROCEDURE — 99214 OFFICE O/P EST MOD 30 MIN: CPT | Performed by: FAMILY MEDICINE
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
## 2025-07-02 NOTE — PATIENT INSTRUCTIONS
Si todo esta kendall, puede regresar a wagner trabajo en 2 semanas    Si no, llama para kayla otra alphonso    Puede tratar benadyl o unisom sin receta para dormir    Puede continua melatonin 5-6 mg cada noche    Regresa en 3 meses para wagner physico

## 2025-07-08 ENCOUNTER — TELEPHONE (OUTPATIENT)
Dept: FAMILY MEDICINE CLINIC | Facility: CLINIC | Age: 60
End: 2025-07-08

## 2025-07-10 ENCOUNTER — TELEPHONE (OUTPATIENT)
Dept: FAMILY MEDICINE CLINIC | Facility: CLINIC | Age: 60
End: 2025-07-10

## 2025-07-10 NOTE — TELEPHONE ENCOUNTER
Patient has dropped off forms, for Dr. Janes Gale patient would like Dr. Janes Gale to complete and fax back to patient employer. Patient would like a call once forms have been completed and faxed to [phone].  Forms placed in [place]   Please call home phone number.

## 2025-07-18 RX ORDER — LANCETS 30 GAUGE
1 EACH MISCELLANEOUS 2 TIMES DAILY
Qty: 100 EACH | Refills: 1 | Status: SHIPPED | OUTPATIENT
Start: 2025-07-18

## 2025-07-18 NOTE — TELEPHONE ENCOUNTER
Requested Prescriptions     Pending Prescriptions Disp Refills    ONETOUCH DELICA PLUS [id number] Does not apply Misc [Pharmacy Med Name: ONE TOUCH DELICA PLUS 30G LANCETS] 100 each 1     Sig: USE AS DIRECTED TWICE DAILY           Last OV: 7/2    Next OV: 10/6

## 2025-07-21 ENCOUNTER — MED REC SCAN ONLY (OUTPATIENT)
Dept: FAMILY MEDICINE CLINIC | Facility: CLINIC | Age: 60
End: 2025-07-21

## 2025-07-21 ENCOUNTER — TELEPHONE (OUTPATIENT)
Dept: FAMILY MEDICINE CLINIC | Facility: CLINIC | Age: 60
End: 2025-07-21

## 2025-08-02 DIAGNOSIS — I10 ESSENTIAL HYPERTENSION: ICD-10-CM

## 2025-08-04 ENCOUNTER — TELEPHONE (OUTPATIENT)
Dept: FAMILY MEDICINE CLINIC | Facility: CLINIC | Age: 60
End: 2025-08-04

## 2025-08-04 RX ORDER — ATORVASTATIN CALCIUM 40 MG/1
40 TABLET, FILM COATED ORAL NIGHTLY
Qty: 90 TABLET | Refills: 1 | Status: SHIPPED | OUTPATIENT
Start: 2025-08-04

## 2025-08-04 RX ORDER — HYDROCHLOROTHIAZIDE 25 MG/1
25 TABLET ORAL DAILY
Qty: 90 TABLET | Refills: 1 | OUTPATIENT
Start: 2025-08-04

## 2025-08-05 RX ORDER — LOSARTAN POTASSIUM AND HYDROCHLOROTHIAZIDE 25; 100 MG/1; MG/1
1 TABLET ORAL DAILY
Qty: 90 TABLET | Refills: 1 | Status: SHIPPED | OUTPATIENT
Start: 2025-08-05 | End: 2026-07-31

## (undated) DIAGNOSIS — I10 ESSENTIAL HYPERTENSION: ICD-10-CM

## (undated) NOTE — LETTER
Date: 7/2/2025    Patient Name: Bruce Russo          To Whom it may concern:    This letter has been written at the patient's request. The above patient was seen at [facility] for treatment of a medical condition.    This patient should be excused from attending work/school from 7/2/25 through 7/18/25.    The patient may return to work/school on 7/21/25 with the following limitations - none.        Sincerely,    JOANNE OLMSTEAD MD

## (undated) NOTE — LETTER
Date: 6/17/2025    Patient Name: Bruce Russo          To Whom it may concern:    The above patient was seen at [facility] for treatment of a medical condition.    This patient should be excused from attending work/school from 6/9/25 through 7/4/25.    We will re-evaluate in that time.        Sincerely,    JOANNE OLMSTEAD MD

## (undated) NOTE — LETTER
Date: 2/5/2020     Patient Name: Danica Irizarry          To Whom it may concern: The above patient was seen at the [facility] for treatment of a medical condition.     This patient should be excused from attending workl from 02/05/2020 thr

## (undated) NOTE — Clinical Note
Thank you so much for the referral. Patient seen for diabetes education today, please refer to note for specifics. Follow up indicated in 2 weeks.

## (undated) NOTE — ED AVS SNAPSHOT
Fidencio Morales   MRN: [id number]    Department:  [facility] Emergency Department   Date of Visit:  8/3/2018           Disclosure     Insurance plans vary and the physician(s) referred by the ER may not be covered by your plan.  Please contact your tell this physician (or your personal doctor if your instructions are to return to your personal doctor) about any new or lasting problems. The primary care or specialist physician will see patients referred from the [facility] Emergency Department.  Jay Blunt

## (undated) NOTE — LETTER
02/07/20    Patient Name: Saint [place]          To Whom it may concern: The above patient was seen at the [facility] for treatment of a medical condition.     This patient should be excused from attending work from 02/07/2020 through 02/08